# Patient Record
Sex: MALE | Race: WHITE | NOT HISPANIC OR LATINO | Employment: PART TIME | URBAN - METROPOLITAN AREA
[De-identification: names, ages, dates, MRNs, and addresses within clinical notes are randomized per-mention and may not be internally consistent; named-entity substitution may affect disease eponyms.]

---

## 2017-10-31 ENCOUNTER — TELEPHONE (OUTPATIENT)
Dept: FAMILY MEDICINE | Facility: CLINIC | Age: 27
End: 2017-10-31

## 2017-10-31 NOTE — TELEPHONE ENCOUNTER
----- Message from Janna Lindsay sent at 10/31/2017  4:05 PM CDT -----  Contact: self   Patient want to speak with a nurse regarding scheduling an appointment to establish care and medication refill. Please call back at [phone]

## 2017-11-02 ENCOUNTER — TELEPHONE (OUTPATIENT)
Dept: FAMILY MEDICINE | Facility: CLINIC | Age: 27
End: 2017-11-02

## 2017-11-02 ENCOUNTER — OFFICE VISIT (OUTPATIENT)
Dept: FAMILY MEDICINE | Facility: CLINIC | Age: 27
End: 2017-11-02
Payer: MEDICAID

## 2017-11-02 ENCOUNTER — LAB VISIT (OUTPATIENT)
Dept: LAB | Facility: HOSPITAL | Age: 27
End: 2017-11-02
Attending: INTERNAL MEDICINE
Payer: MEDICAID

## 2017-11-02 VITALS
DIASTOLIC BLOOD PRESSURE: 84 MMHG | WEIGHT: 159.63 LBS | BODY MASS INDEX: 22.85 KG/M2 | SYSTOLIC BLOOD PRESSURE: 112 MMHG | HEART RATE: 72 BPM | TEMPERATURE: 98 F | HEIGHT: 70 IN

## 2017-11-02 DIAGNOSIS — Z79.899 HIGH RISK MEDICATION USE: ICD-10-CM

## 2017-11-02 DIAGNOSIS — G40.909 NONINTRACTABLE EPILEPSY WITHOUT STATUS EPILEPTICUS, UNSPECIFIED EPILEPSY TYPE: ICD-10-CM

## 2017-11-02 DIAGNOSIS — G40.109 PARTIAL SYMPTOMATIC EPILEPSY WITH SIMPLE PARTIAL SEIZURES, NOT INTRACTABLE, WITHOUT STATUS EPILEPTICUS: ICD-10-CM

## 2017-11-02 DIAGNOSIS — G40.109 PARTIAL SYMPTOMATIC EPILEPSY WITH SIMPLE PARTIAL SEIZURES, NOT INTRACTABLE, WITHOUT STATUS EPILEPTICUS: Primary | ICD-10-CM

## 2017-11-02 DIAGNOSIS — G40.109 TEMPORAL LOBE EPILEPSY: ICD-10-CM

## 2017-11-02 LAB
ALBUMIN SERPL BCP-MCNC: 4.3 G/DL
ALP SERPL-CCNC: 109 U/L
ALT SERPL W/O P-5'-P-CCNC: 23 U/L
ANION GAP SERPL CALC-SCNC: 7 MMOL/L
AST SERPL-CCNC: 22 U/L
BASOPHILS # BLD AUTO: 0.03 K/UL
BASOPHILS NFR BLD: 0.5 %
BILIRUB SERPL-MCNC: 0.5 MG/DL
BUN SERPL-MCNC: 17 MG/DL
CALCIUM SERPL-MCNC: 9.6 MG/DL
CHLORIDE SERPL-SCNC: 102 MMOL/L
CO2 SERPL-SCNC: 27 MMOL/L
CREAT SERPL-MCNC: 1 MG/DL
DIFFERENTIAL METHOD: ABNORMAL
EOSINOPHIL # BLD AUTO: 0.2 K/UL
EOSINOPHIL NFR BLD: 2.4 %
ERYTHROCYTE [DISTWIDTH] IN BLOOD BY AUTOMATED COUNT: 11.4 %
EST. GFR  (AFRICAN AMERICAN): >60 ML/MIN/1.73 M^2
EST. GFR  (NON AFRICAN AMERICAN): >60 ML/MIN/1.73 M^2
GLUCOSE SERPL-MCNC: 88 MG/DL
HCT VFR BLD AUTO: 43.6 %
HGB BLD-MCNC: 14.9 G/DL
IMM GRANULOCYTES # BLD AUTO: 0.03 K/UL
IMM GRANULOCYTES NFR BLD AUTO: 0.5 %
LYMPHOCYTES # BLD AUTO: 1.9 K/UL
LYMPHOCYTES NFR BLD: 29 %
MCH RBC QN AUTO: 30.7 PG
MCHC RBC AUTO-ENTMCNC: 34.2 G/DL
MCV RBC AUTO: 90 FL
MONOCYTES # BLD AUTO: 0.6 K/UL
MONOCYTES NFR BLD: 8.8 %
NEUTROPHILS # BLD AUTO: 3.9 K/UL
NEUTROPHILS NFR BLD: 58.8 %
NRBC BLD-RTO: 0 /100 WBC
PLATELET # BLD AUTO: 168 K/UL
PMV BLD AUTO: 11.9 FL
POTASSIUM SERPL-SCNC: 4.3 MMOL/L
PROT SERPL-MCNC: 8.4 G/DL
RBC # BLD AUTO: 4.85 M/UL
SODIUM SERPL-SCNC: 136 MMOL/L
WBC # BLD AUTO: 6.56 K/UL

## 2017-11-02 PROCEDURE — 85025 COMPLETE CBC W/AUTO DIFF WBC: CPT

## 2017-11-02 PROCEDURE — 99213 OFFICE O/P EST LOW 20 MIN: CPT | Mod: S$PBB,,, | Performed by: INTERNAL MEDICINE

## 2017-11-02 PROCEDURE — 99999 PR PBB SHADOW E&M-EST. PATIENT-LVL III: CPT | Mod: PBBFAC,,, | Performed by: INTERNAL MEDICINE

## 2017-11-02 PROCEDURE — 80053 COMPREHEN METABOLIC PANEL: CPT

## 2017-11-02 PROCEDURE — 80183 DRUG SCRN QUANT OXCARBAZEPIN: CPT

## 2017-11-02 PROCEDURE — 99213 OFFICE O/P EST LOW 20 MIN: CPT | Mod: PBBFAC,PN | Performed by: INTERNAL MEDICINE

## 2017-11-02 RX ORDER — LEVETIRACETAM 750 MG/1
1500 TABLET ORAL 2 TIMES DAILY
Qty: 120 TABLET | Refills: 0 | Status: SHIPPED | OUTPATIENT
Start: 2017-11-02 | End: 2017-12-07 | Stop reason: SDUPTHER

## 2017-11-02 RX ORDER — OXCARBAZEPINE 300 MG/1
TABLET, FILM COATED ORAL
Qty: 150 TABLET | Refills: 0 | Status: SHIPPED | OUTPATIENT
Start: 2017-11-02 | End: 2017-12-07 | Stop reason: SDUPTHER

## 2017-11-02 RX ORDER — CLONAZEPAM 1 MG/1
1 TABLET ORAL 2 TIMES DAILY
Qty: 60 TABLET | Refills: 0 | Status: SHIPPED | OUTPATIENT
Start: 2017-11-02 | End: 2017-11-02 | Stop reason: SDUPTHER

## 2017-11-02 RX ORDER — CLONAZEPAM 1 MG/1
1 TABLET ORAL 2 TIMES DAILY
Qty: 60 TABLET | Refills: 0 | Status: SHIPPED | OUTPATIENT
Start: 2017-11-02 | End: 2017-11-30 | Stop reason: SDUPTHER

## 2017-11-02 NOTE — TELEPHONE ENCOUNTER
Spoke with pharmacy, states has not received Rx. Printed confirmation receipt and faxed to pharmacy.

## 2017-11-02 NOTE — TELEPHONE ENCOUNTER
----- Message from Eliza Cardenas sent at 11/2/2017 12:36 PM CDT -----  Contact: Elias  Pharmacy advises Elias they have no prescriptions for him. Please call him to advise when resent.  [phone] (home)   Thanks!

## 2017-11-02 NOTE — PROGRESS NOTES
Assessment and Plan:    1. Partial symptomatic epilepsy with simple partial seizures, not intractable, without status epilepticus  Patient presented to establish care, but had no complaints or concerns other than needing anti-epileptic medications refilled. He had called prior to making this apt, and was clearly told that I would at most prescribe a short duration of these medications until he could get in to see his neurologist and would not be prescribing these medications on an ongoing basis. Unfortunately, he had not mentioned prior to arriving today that since his insurance changed to Medicaid, he was told he would not be able to continue seeing his previous Neurologist. Checked today and there are no available appointments for any [facility] for Medicaid patients at this time. He was given the escalation number and told that he needs to establish care with neurology as this is not something that I will be prescribing or monitoring on an ongoing basis.    - Comprehensive metabolic panel; Future  - CBC auto differential; Future  - OXCARBAZEPINE METABOLITE ([facility]); Future    2. High risk medication use  - Comprehensive metabolic panel; Future  - CBC auto differential; Future  - OXCARBAZEPINE METABOLITE ([facility]); Future    3. Nonintractable epilepsy without status epilepticus, unspecified epilepsy type  4. Temporal lobe epilepsy  - levETIRAcetam (KEPPRA) 750 MG Tab; Take 2 tablets (1,500 mg total) by mouth 2 (two) times daily.  Dispense: 120 tablet; Refill: 0  - OXcarbazepine (TRILEPTAL) 300 MG Tab; 2-1/2 tabs PO BID  Dispense: 150 tablet; Refill: 0  - clonazePAM (KLONOPIN) 1 MG tablet; Take 1 tablet (1 mg total) by mouth 2 (two) times daily.  Dispense: 60 tablet; Refill: 0        ______________________________________________________________________  Subjective:    Chief Complaint:  Establish Care    HPI:  Elias is a 27 y.o. year old man, new to me but previously seen by primary care at [facility] (seen by NP in the last 3 years but no visit with patient's previous PCP in the last 3 years), here to establish care.      He has a history of medically intractable epilepsy and underwent temporal lobectomy in childhood. He was last seen by [facility] in March 2015 prior to moving out of [facility].     He presents today to establish care after running out of his anti-epileptic medications. He notes that he has been on stable doses of these medications since around 2009, and has not had a seizure since being on this combination of medications. He denies having any side effects on these medications.     Past Medical History:  Past Medical History:   Diagnosis Date    Allergy     Epilepsy     Restless leg syndrome     Seizures         Past Surgical History:  Past Surgical History:   Procedure Laterality Date    LOBECTOMY FOR SEIZURE FOCUS      temoral         Family History:  Family History   Problem Relation Age of Onset    Seizures Paternal Grandmother     Cancer Paternal Grandmother     Emphysema Maternal Grandmother     Diabetes Maternal Grandmother     Tuberculosis Maternal Grandmother     Diabetes Maternal Grandfather     Arthritis Paternal Grandfather        Social History:  Social History     Social History    Marital status: Single     Spouse name: N/A    Number of children: N/A    Years of education: N/A     Social History Main Topics    Smoking status: Never Smoker    Smokeless tobacco: Never Used    Alcohol use No    Drug use: No    Sexual activity: Not Currently     Other Topics Concern    None     Social History Narrative    Currently not working, looking for a job since moving back to [place].        Medications:      Current Outpatient Prescriptions:     clonazePAM (KLONOPIN) 1 MG tablet, Take 1 tablet (1 mg total) by mouth 2 (two) times daily., Disp: 60 tablet, Rfl: 0    fluticasone (FLONASE) 50 mcg/actuation nasal spray, 1 spray by Each Nare route once daily.,  "Disp: 48 g, Rfl: 0    levETIRAcetam (KEPPRA) 750 MG Tab, Take 2 tablets (1,500 mg total) by mouth 2 (two) times daily., Disp: 120 tablet, Rfl: 0    OXcarbazepine (TRILEPTAL) 300 MG Tab, 2-1/2 tabs PO BID, Disp: 150 tablet, Rfl: 0      Allergies:  Phenobarbital    Immunizations:  Immunization History   Administered Date(s) Administered    Influenza A (H1N1) 2009 Monovalent - IM - PF 12/16/2009    Tdap 09/05/2012       Review of Systems:  Review of Systems   Constitutional: Negative for chills and fever.   Respiratory: Negative for cough and shortness of breath.    Cardiovascular: Negative for chest pain and leg swelling.   Gastrointestinal: Negative for constipation, diarrhea, nausea and vomiting.   Neurological: Negative for tingling, tremors, sensory change, speech change, focal weakness, seizures and loss of consciousness.       Objective:    Vitals:  Vitals:    11/02/17 0952   BP: 112/84   Pulse: 72   Temp: 98.2 °F (36.8 °C)   Weight: 72.4 kg (159 lb 9.8 oz)   Height: 5' 10" (1.778 m)   PainSc: 0-No pain       Physical Exam   Constitutional: He is oriented to person, place, and time. He appears well-developed and well-nourished. No distress.   HENT:   Mouth/Throat: Oropharynx is clear and moist.   Eyes: No scleral icterus.   Cardiovascular: Normal rate and regular rhythm.    No murmur heard.  Pulmonary/Chest: Effort normal and breath sounds normal. No respiratory distress. He has no wheezes.   Musculoskeletal: He exhibits no edema.   Neurological: He is alert and oriented to person, place, and time.   choreiform movements in left arm (stable per patient and discussed in previous neurology notes)   Skin: Skin is warm and dry.   Psychiatric: He has a normal mood and affect. His behavior is normal.   Vitals reviewed.      Data:  Previous neurology notes reviewed and pertinent for history as described in HPI.        Cristina Huang MD  Internal Medicine    "

## 2017-11-02 NOTE — LETTER
November 2, 2017      No Recipients           [facility]  [address]  [place] [address]  Phone: [phone]  Fax: [phone]          Patient: Elias Lopez   MR Number: [id number]   YOB: 1990   Date of Visit: 11/2/2017       Dear :    Thank you for referring Elias Lopez to me for evaluation. Attached you will find relevant portions of my assessment and plan of care.    If you have questions, please do not hesitate to call me. I look forward to following Elias Lopez along with you.    Sincerely,    Cristina Huang MD    Enclosure  CC:  No Recipients    If you would like to receive this communication electronically, please contact [email or web address] or [phone] to request more information on [employer] access.    For providers and/or their staff who would like to refer a patient to [facility], please contact us through our one-stop-shop provider referral line, Samira Basurto, at [phone].    If you feel you have received this communication in error or would no longer like to receive these types of communications, please e-mail [email or web address]

## 2017-11-06 ENCOUNTER — TELEPHONE (OUTPATIENT)
Dept: NEUROLOGY | Facility: CLINIC | Age: 27
End: 2017-11-06

## 2017-11-06 LAB — OXCARBAZEPINE METABOLITE: 23 MCG/ML

## 2017-11-06 NOTE — TELEPHONE ENCOUNTER
----- Message from Eliza Cardenas sent at 11/3/2017 10:03 AM CDT -----  Contact: Elias Griffin say Dr. Borja 2 years ago. Dr. Huang told him yesterday that he needs to come back to clinic to get seizure medication. He was in [place] previously and came to [place]Dr. Huang gave enough medication until he ca be seen (60 Tablets). Please call him [phone] (home)   Thanks!

## 2017-11-20 ENCOUNTER — HOSPITAL ENCOUNTER (EMERGENCY)
Facility: HOSPITAL | Age: 27
Discharge: HOME | End: 2017-11-21
Attending: EMERGENCY MEDICINE
Payer: MEDICAID

## 2017-11-20 DIAGNOSIS — N20.0 KIDNEY STONE ON RIGHT SIDE: Primary | ICD-10-CM

## 2017-11-20 DIAGNOSIS — R10.9 RIGHT FLANK PAIN: ICD-10-CM

## 2017-11-20 PROCEDURE — 99284 EMERGENCY DEPT VISIT MOD MDM: CPT | Mod: 25

## 2017-11-20 PROCEDURE — 96361 HYDRATE IV INFUSION ADD-ON: CPT

## 2017-11-20 PROCEDURE — 96374 THER/PROPH/DIAG INJ IV PUSH: CPT

## 2017-11-21 ENCOUNTER — TELEPHONE (OUTPATIENT)
Dept: UROLOGY | Facility: CLINIC | Age: 27
End: 2017-11-21

## 2017-11-21 VITALS
HEART RATE: 66 BPM | HEIGHT: 70 IN | RESPIRATION RATE: 16 BRPM | OXYGEN SATURATION: 98 % | TEMPERATURE: 99 F | DIASTOLIC BLOOD PRESSURE: 58 MMHG | WEIGHT: 157.63 LBS | SYSTOLIC BLOOD PRESSURE: 100 MMHG | BODY MASS INDEX: 22.57 KG/M2

## 2017-11-21 LAB
ALBUMIN SERPL BCP-MCNC: 4.1 G/DL
ALP SERPL-CCNC: 103 U/L
ALT SERPL W/O P-5'-P-CCNC: 16 U/L
ANION GAP SERPL CALC-SCNC: 10 MMOL/L
AST SERPL-CCNC: 19 U/L
BACTERIA #/AREA URNS HPF: ABNORMAL /HPF
BASOPHILS # BLD AUTO: 0 K/UL
BASOPHILS NFR BLD: 0.2 %
BILIRUB SERPL-MCNC: 0.3 MG/DL
BILIRUB UR QL STRIP: NEGATIVE
BUN SERPL-MCNC: 13 MG/DL
CALCIUM SERPL-MCNC: 9.3 MG/DL
CAOX CRY URNS QL MICRO: ABNORMAL
CHLORIDE SERPL-SCNC: 105 MMOL/L
CLARITY UR: ABNORMAL
CO2 SERPL-SCNC: 26 MMOL/L
COLOR UR: YELLOW
CREAT SERPL-MCNC: 1 MG/DL
DIFFERENTIAL METHOD: ABNORMAL
EOSINOPHIL # BLD AUTO: 0.1 K/UL
EOSINOPHIL NFR BLD: 0.7 %
ERYTHROCYTE [DISTWIDTH] IN BLOOD BY AUTOMATED COUNT: 12.9 %
EST. GFR  (AFRICAN AMERICAN): >60 ML/MIN/1.73 M^2
EST. GFR  (NON AFRICAN AMERICAN): >60 ML/MIN/1.73 M^2
GLUCOSE SERPL-MCNC: 126 MG/DL
GLUCOSE UR QL STRIP: NEGATIVE
HCT VFR BLD AUTO: 41.7 %
HGB BLD-MCNC: 14.4 G/DL
HGB UR QL STRIP: ABNORMAL
HYALINE CASTS #/AREA URNS LPF: 0 /LPF
KETONES UR QL STRIP: NEGATIVE
LEUKOCYTE ESTERASE UR QL STRIP: NEGATIVE
LIPASE SERPL-CCNC: 22 U/L
LYMPHOCYTES # BLD AUTO: 1.2 K/UL
LYMPHOCYTES NFR BLD: 11.1 %
MCH RBC QN AUTO: 31.7 PG
MCHC RBC AUTO-ENTMCNC: 34.5 G/DL
MCV RBC AUTO: 92 FL
MICROSCOPIC COMMENT: ABNORMAL
MONOCYTES # BLD AUTO: 0.7 K/UL
MONOCYTES NFR BLD: 7.1 %
NEUTROPHILS # BLD AUTO: 8.6 K/UL
NEUTROPHILS NFR BLD: 80.9 %
NITRITE UR QL STRIP: NEGATIVE
PH UR STRIP: 7 [PH] (ref 5–8)
PLATELET # BLD AUTO: 155 K/UL
PMV BLD AUTO: 9.7 FL
POTASSIUM SERPL-SCNC: 3.5 MMOL/L
PROT SERPL-MCNC: 7.4 G/DL
PROT UR QL STRIP: ABNORMAL
RBC # BLD AUTO: 4.53 M/UL
RBC #/AREA URNS HPF: 80 /HPF (ref 0–4)
SODIUM SERPL-SCNC: 141 MMOL/L
SP GR UR STRIP: 1.02 (ref 1–1.03)
URN SPEC COLLECT METH UR: ABNORMAL
UROBILINOGEN UR STRIP-ACNC: NEGATIVE EU/DL
WBC # BLD AUTO: 10.6 K/UL
WBC #/AREA URNS HPF: 1 /HPF (ref 0–5)

## 2017-11-21 PROCEDURE — 83690 ASSAY OF LIPASE: CPT

## 2017-11-21 PROCEDURE — 85025 COMPLETE CBC W/AUTO DIFF WBC: CPT

## 2017-11-21 PROCEDURE — 81000 URINALYSIS NONAUTO W/SCOPE: CPT

## 2017-11-21 PROCEDURE — 80053 COMPREHEN METABOLIC PANEL: CPT

## 2017-11-21 PROCEDURE — 36415 COLL VENOUS BLD VENIPUNCTURE: CPT

## 2017-11-21 PROCEDURE — 63600175 PHARM REV CODE 636 W HCPCS: Performed by: EMERGENCY MEDICINE

## 2017-11-21 PROCEDURE — 25000003 PHARM REV CODE 250: Performed by: EMERGENCY MEDICINE

## 2017-11-21 RX ORDER — KETOROLAC TROMETHAMINE 30 MG/ML
10 INJECTION, SOLUTION INTRAMUSCULAR; INTRAVENOUS
Status: COMPLETED | OUTPATIENT
Start: 2017-11-21 | End: 2017-11-21

## 2017-11-21 RX ORDER — TAMSULOSIN HYDROCHLORIDE 0.4 MG/1
0.4 CAPSULE ORAL DAILY
Qty: 30 CAPSULE | Refills: 3 | Status: SHIPPED | OUTPATIENT
Start: 2017-11-21 | End: 2017-12-07

## 2017-11-21 RX ORDER — ONDANSETRON 4 MG/1
4 TABLET, ORALLY DISINTEGRATING ORAL EVERY 8 HOURS PRN
Qty: 12 TABLET | Refills: 0 | Status: SHIPPED | OUTPATIENT
Start: 2017-11-21 | End: 2017-12-07

## 2017-11-21 RX ORDER — KETOROLAC TROMETHAMINE 30 MG/ML
INJECTION, SOLUTION INTRAMUSCULAR; INTRAVENOUS
Status: COMPLETED
Start: 2017-11-21 | End: 2017-11-21

## 2017-11-21 RX ORDER — HYDROCODONE BITARTRATE AND ACETAMINOPHEN 5; 325 MG/1; MG/1
1 TABLET ORAL EVERY 6 HOURS PRN
Qty: 16 TABLET | Refills: 0 | Status: SHIPPED | OUTPATIENT
Start: 2017-11-21 | End: 2017-12-07

## 2017-11-21 RX ADMIN — SODIUM CHLORIDE 1000 ML: 0.9 INJECTION, SOLUTION INTRAVENOUS at 12:11

## 2017-11-21 RX ADMIN — KETOROLAC TROMETHAMINE 10 MG: 30 INJECTION, SOLUTION INTRAMUSCULAR at 12:11

## 2017-11-21 NOTE — TELEPHONE ENCOUNTER
Returned call and spoke with patient, informed that the NP looked over his chart, he does not need a appt this week, she will send in some flomax for him to start taking because he should be able to pass the stone and give us a call next week if he does not, patient verbally understood.

## 2017-11-21 NOTE — ED NOTES
"Patient identifiers for Elias Drewwer checked and correct.  LOC:  Patient is awake, alert, and aware of environment with an appropriate affect. Patient is oriented x 3 and speaking appropriately.  APPEARANCE:  Patient resting comfortably and in no acute distress. Patient is clean and well groomed, patient's clothing is properly fastened.  SKIN:  The skin is warm and dry. Patient has normal skin turgor and moist mucus membranes. Skin is intact; no bruising or breakdown noted.  MUSCULOSKELETAL:  Pt has left sided tremors, baseline. Pt has unsteady gait due to pain being so bad, walks hunched over. Pulses intact.   RESPIRATORY:  Airway is open and patent. Respirations are spontaneous and non-labored with normal effort and rate.  CARDIAC:  Patient has a normal rate and rhythm. No peripheral edema noted. Capillary refill < 3 seconds.  ABDOMEN:  No distention noted.  Tenderness to RLQ radiating across abdomen. C/O "pin needles" when urinating.   NEUROLOGICAL:  PERRL. Facial expression is symmetrical. Hand grasps are equal bilaterally. Normal sensation in all extremities when touched with finger.  Allergies reported:    Review of patient's allergies indicates:   Allergen Reactions    Phenobarbital      OTHER NOTES: Pt in bed, locked, lowest position, side rails up, Will continue to monitor.      "

## 2017-11-21 NOTE — ED PROVIDER NOTES
Encounter Date: 11/20/2017    SCRIBE #1 NOTE: I, Afia Cintron , am scribing for, and in the presence of,  Dr. Main . I have scribed the entire note.       History     Chief Complaint   Patient presents with    Abdominal Pain     right side that started around 7 pm    Vomiting     Times 2 in the last hour with blood noted in it     11/21/2017  12:10 AM     Chief Complaint: Abdominal pain       The patient is a 27 y.o. male who is presenting with the acute onset of RLQ abdominal pain that began x 2 hours ago. The pt reports that the pain is constant, moderate, and does not radiate. He denies any exacerbating or mitigating factors. Associated sx includes x 2 episodes of emesis with scant amount of blood present. He denies recent urinary sx, diarrhea, constipation, or hematochezia. Pertinent PMHx includes seizures. No pertinent past surgical hx.              The history is provided by the patient and medical records.     Review of patient's allergies indicates:   Allergen Reactions    Phenobarbital      Past Medical History:   Diagnosis Date    Allergy     Epilepsy     Restless leg syndrome     Seizures      Past Surgical History:   Procedure Laterality Date    LOBECTOMY FOR SEIZURE FOCUS      temoral       Family History   Problem Relation Age of Onset    Seizures Paternal Grandmother     Cancer Paternal Grandmother     Emphysema Maternal Grandmother     Diabetes Maternal Grandmother     Tuberculosis Maternal Grandmother     Diabetes Maternal Grandfather     Arthritis Paternal Grandfather      Social History   Substance Use Topics    Smoking status: Never Smoker    Smokeless tobacco: Never Used    Alcohol use No     Review of Systems   Constitutional: Negative for fever.   HENT: Negative for sore throat.    Eyes: Negative for visual disturbance.   Respiratory: Negative for shortness of breath.    Cardiovascular: Negative for chest pain.   Gastrointestinal: Positive for abdominal pain and vomiting  (with blood ). Negative for nausea.   Genitourinary: Negative for dysuria.   Musculoskeletal: Negative for back pain.   Skin: Negative for rash.   Neurological: Negative for weakness.   Hematological: Does not bruise/bleed easily.   Psychiatric/Behavioral: Negative for confusion.       Physical Exam     Initial Vitals [11/20/17 2342]   BP Pulse Resp Temp SpO2   (!) 126/92 68 16 98.9 °F (37.2 °C) 97 %      MAP       103.33         Physical Exam    Nursing note and vitals reviewed.  Constitutional: He appears well-developed and well-nourished.   HENT:   Head: Normocephalic.   Mouth/Throat: Oropharynx is clear and moist.   Well healing abrasions present across the forehead and nose    Eyes: EOM are normal. Pupils are equal, round, and reactive to light.   Neck: Normal range of motion. Neck supple.   Cardiovascular: Normal rate, regular rhythm, normal heart sounds and intact distal pulses. Exam reveals no gallop and no friction rub.    No murmur heard.  Pulmonary/Chest: Breath sounds normal. He has no wheezes. He has no rhonchi. He has no rales.   Abdominal: Soft. He exhibits no distension. There is tenderness. There is no rebound.   RLQ TTP   Musculoskeletal: Normal range of motion. He exhibits no edema or tenderness.   Lymphadenopathy:     He has no cervical adenopathy.   Neurological: He is alert and oriented to person, place, and time. He has normal strength. No sensory deficit.   Skin: Skin is warm and dry. Capillary refill takes less than 2 seconds.         ED Course   Procedures  Labs Reviewed   LIPASE   URINALYSIS   COMPREHENSIVE METABOLIC PANEL   CBC W/ AUTO DIFFERENTIAL             Medical Decision Making:   Initial Assessment:   27-year-old male presented with a chief complaint of flank pain.  Differential Diagnosis:   Ddx includes but is not limited to appendicitis, nephrolithiasis, pyelonephritis, viral illness.   ED Management:  The patient was emergently evaluated in the emergency Department, his  evaluation was significant for a young male with right flank tenderness.  The patient's labs were significant for large amount of red blood cells in his urine.  The patient's pain was treated with parenteral Toradol, with improvement   In it.  The patient's CT scan does show a 2 mm stone at the right UVJ with some mild right-sided hydronephrosis and hydroureter.  The patient's diagnosis is a right-sided kidney stone.  He is stable for discharge to home.  He will be discharged home with by mouth pain medication and ODT Zofran.  He is referred to urology for follow-up and further care.            Scribe Attestation:   Scribe #1: I performed the above scribed service and the documentation accurately describes the services I performed. I attest to the accuracy of the note.    I, Dr. Bonilla Main, personally performed the services described in this documentation. All medical record entries made by the scribe were at my direction and in my presence.  I have reviewed the chart and agree that the record reflects my personal performance and is accurate and complete. Bonilla Main MD.  12:17 AM 11/21/2017          ED Course      Clinical Impression:   The primary encounter diagnosis was Kidney stone on right side. A diagnosis of Right flank pain was also pertinent to this visit.                           Bonilla Main MD  11/21/17 0257

## 2017-11-24 ENCOUNTER — TELEPHONE (OUTPATIENT)
Dept: UROLOGY | Facility: CLINIC | Age: 27
End: 2017-11-24

## 2017-11-24 NOTE — TELEPHONE ENCOUNTER
Returned call and spoke with patient, patient states he only wants to see Dr Feng, soonest appt made, patient verbally understood.

## 2017-11-24 NOTE — TELEPHONE ENCOUNTER
----- Message from Sherine Dickerson sent at 11/24/2017 10:17 AM CST -----  Patient was seen in Emergency room at [facility] for kidney stone/was told to follow up with doctor/requesting to see doctor ONLY/no soon appointment showing available/please call patient back at [phone] to schedule or advise.

## 2017-11-30 DIAGNOSIS — G40.109 TEMPORAL LOBE EPILEPSY: ICD-10-CM

## 2017-11-30 DIAGNOSIS — G40.909 NONINTRACTABLE EPILEPSY WITHOUT STATUS EPILEPTICUS, UNSPECIFIED EPILEPSY TYPE: ICD-10-CM

## 2017-11-30 RX ORDER — CLONAZEPAM 1 MG/1
1 TABLET ORAL 2 TIMES DAILY
Qty: 14 TABLET | Refills: 0 | Status: SHIPPED | OUTPATIENT
Start: 2017-11-30 | End: 2017-12-01 | Stop reason: SDUPTHER

## 2017-11-30 NOTE — TELEPHONE ENCOUNTER
----- Message from Eliza Cardenas sent at 11/30/2017 11:52 AM CST -----  Contact: Elias  Calling for a refill on   Rx clonazePAM (KLONOPIN) 1 MG tablet    [facility] [place]  [address]  [address]  [place] [address]  Phone: [phone] Fax: [phone]

## 2017-11-30 NOTE — TELEPHONE ENCOUNTER
Spoke to patient, notes that he has an appointment with a new Neurologist on Dec 7th. Have agreed to prescribe 1 additional week of the clonazepam with no refills after this.

## 2017-12-01 DIAGNOSIS — G40.909 NONINTRACTABLE EPILEPSY WITHOUT STATUS EPILEPTICUS, UNSPECIFIED EPILEPSY TYPE: ICD-10-CM

## 2017-12-01 DIAGNOSIS — G40.109 TEMPORAL LOBE EPILEPSY: ICD-10-CM

## 2017-12-01 RX ORDER — CLONAZEPAM 1 MG/1
1 TABLET ORAL 2 TIMES DAILY
Qty: 14 TABLET | Refills: 0 | Status: SHIPPED | OUTPATIENT
Start: 2017-12-01 | End: 2017-12-07 | Stop reason: SDUPTHER

## 2017-12-01 NOTE — TELEPHONE ENCOUNTER
----- Message from Stephanie Ferrara sent at 12/1/2017  1:20 PM CST -----  Contact: self   Patient need a refill on clonazePAM please send to [facility], any questions please call back at [phone] (home)     [facility] [place] - [address]  [address]  [place] [address]  Phone: [phone] Fax: [phone]

## 2017-12-07 ENCOUNTER — TELEPHONE (OUTPATIENT)
Dept: NEUROLOGY | Facility: CLINIC | Age: 27
End: 2017-12-07

## 2017-12-07 ENCOUNTER — HOSPITAL ENCOUNTER (OUTPATIENT)
Dept: RADIOLOGY | Facility: HOSPITAL | Age: 27
Discharge: HOME | End: 2017-12-07
Attending: PSYCHIATRY & NEUROLOGY
Payer: MEDICAID

## 2017-12-07 ENCOUNTER — OFFICE VISIT (OUTPATIENT)
Dept: NEUROLOGY | Facility: CLINIC | Age: 27
End: 2017-12-07
Payer: MEDICAID

## 2017-12-07 VITALS
HEART RATE: 97 BPM | HEIGHT: 70 IN | DIASTOLIC BLOOD PRESSURE: 72 MMHG | BODY MASS INDEX: 22.65 KG/M2 | SYSTOLIC BLOOD PRESSURE: 121 MMHG | WEIGHT: 158.19 LBS

## 2017-12-07 DIAGNOSIS — G40.109 TEMPORAL LOBE EPILEPSY: Primary | ICD-10-CM

## 2017-12-07 DIAGNOSIS — G40.909 NONINTRACTABLE EPILEPSY WITHOUT STATUS EPILEPTICUS, UNSPECIFIED EPILEPSY TYPE: ICD-10-CM

## 2017-12-07 DIAGNOSIS — Z79.899 HIGH RISK MEDICATION USE: ICD-10-CM

## 2017-12-07 DIAGNOSIS — G40.109 TEMPORAL LOBE EPILEPSY: ICD-10-CM

## 2017-12-07 DIAGNOSIS — G25.71 AKATHISIA: ICD-10-CM

## 2017-12-07 PROCEDURE — 99999 PR PBB SHADOW E&M-EST. PATIENT-LVL III: CPT | Mod: PBBFAC,,, | Performed by: PSYCHIATRY & NEUROLOGY

## 2017-12-07 PROCEDURE — 99214 OFFICE O/P EST MOD 30 MIN: CPT | Mod: S$PBB,,, | Performed by: PSYCHIATRY & NEUROLOGY

## 2017-12-07 PROCEDURE — 77080 DXA BONE DENSITY AXIAL: CPT | Mod: TC,PO

## 2017-12-07 PROCEDURE — 77080 DXA BONE DENSITY AXIAL: CPT | Mod: 26,,, | Performed by: RADIOLOGY

## 2017-12-07 PROCEDURE — 99213 OFFICE O/P EST LOW 20 MIN: CPT | Mod: PBBFAC,25,PN | Performed by: PSYCHIATRY & NEUROLOGY

## 2017-12-07 RX ORDER — OXCARBAZEPINE 300 MG/1
TABLET, FILM COATED ORAL
Qty: 450 TABLET | Refills: 3 | Status: SHIPPED | OUTPATIENT
Start: 2017-12-07 | End: 2018-09-06 | Stop reason: SDUPTHER

## 2017-12-07 RX ORDER — LEVETIRACETAM 750 MG/1
1500 TABLET ORAL 2 TIMES DAILY
Qty: 360 TABLET | Refills: 3 | Status: SHIPPED | OUTPATIENT
Start: 2017-12-07 | End: 2019-01-18 | Stop reason: SDUPTHER

## 2017-12-07 RX ORDER — CLONAZEPAM 1 MG/1
1 TABLET ORAL 2 TIMES DAILY
Qty: 60 TABLET | Refills: 1 | Status: SHIPPED | OUTPATIENT
Start: 2017-12-07 | End: 2018-03-15

## 2017-12-07 NOTE — PROGRESS NOTES
Date of service:  12/7/2017    Chief complaint:  Seizures, questions about DBS    Interval history:  The patient is a 27 y.o. male seen previously for partial onset seizures.  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  I last saw him nearly 3 years ago.  Since the last time he was here, he been taking Keppra 1500 mg BID to his Trileptal 750 mg BID.  He notes no side effects.  He reports no further seizures.  Also of note, he has been told that his visual issues related to his surgery will prevent him from ever being able to drive.  Regarding his movement disorder, the patient has also not been following with Dr. Echevarria.  It appears that it has also been about 3 years since he was seen in movement disorder clinic.  Otherwise the patient has no new complaints.      Past Medical History:   Diagnosis Date    Allergy     Epilepsy     Restless leg syndrome     Seizures        Past Surgical History:   Procedure Laterality Date    LOBECTOMY FOR SEIZURE FOCUS      temoral         Family History   Problem Relation Age of Onset    Seizures Paternal Grandmother     Cancer Paternal Grandmother     Emphysema Maternal Grandmother     Diabetes Maternal Grandmother     Tuberculosis Maternal Grandmother     Diabetes Maternal Grandfather     Arthritis Paternal Grandfather        Social History     Social History    Marital status: Single     Spouse name: N/A    Number of children: N/A    Years of education: N/A     Social History Main Topics    Smoking status: Never Smoker    Smokeless tobacco: Never Used    Alcohol use No    Drug use: No    Sexual activity: Not Currently     Other Topics Concern    None     Social History Narrative    Currently not working, looking for a job since moving back to [place].        Review of systems not significantly changed from previous visit except as noted above.    /72 (BP Location: Right arm, Patient Position:  "Sitting, BP Method: Medium (Automatic))   Pulse 97   Ht 5' 10" (1.778 m)   Wt 71.7 kg (158 lb 2.9 oz)   BMI 22.70 kg/m²   Physical exam not significantly changed from previous visit aside from notable improvement in his left arm movements.    Data base:  Chart reviewed.      Labs (11/17):  CMP: notable for normal sodium and LFTs  CBC: unremarkable  Trileptal: 23    MRI of the brain (1/15):   "1. Postoperative right temporal encephalomalacia.  2. Old right periventricular lacunar infarct."   I independently visualized and interpreted this study.     Assessment and plan:  The patient is a 27 y.o. male who returns for follow up on partial onset seizures, improved after temporal lobectomy.  We will continue his Trileptal at 750mg BID and Keppra 1500 mg BID.  We will check a DEXA, as Trileptal may reduce bone density.  We will check labs for medication monitoring purposes.  Medication side effects, seizure safety, and driving laws were reviewed.      Regarding his left arm movements, these were better today than I recall them being at our last visit.  He was following with Dr. Echevarria for this issue but has not seen him recently.  He did not pursue DBS surgery.  I am a bit confused by his medication regimen for this.  In looking at Epic, it appears that Dr. Echevarria was trying Onfi for him; however, he presents today with Klonopin written by his PCP instead.  I have instructed the patient to follow up in movement disorder clinic to clarify what the appropriate regimen for him should be.  I have given him a 2 month supply of Klonopin so that he will not run out prior to this visit.  I made clear to the patient, though, that this would not be an issue I would be managing for him in the long run.    "

## 2017-12-12 ENCOUNTER — OFFICE VISIT (OUTPATIENT)
Dept: UROLOGY | Facility: CLINIC | Age: 27
End: 2017-12-12
Payer: MEDICAID

## 2017-12-12 ENCOUNTER — HOSPITAL ENCOUNTER (OUTPATIENT)
Dept: RADIOLOGY | Facility: HOSPITAL | Age: 27
Discharge: HOME | End: 2017-12-12
Attending: UROLOGY
Payer: MEDICAID

## 2017-12-12 VITALS
RESPIRATION RATE: 18 BRPM | WEIGHT: 150 LBS | HEART RATE: 81 BPM | TEMPERATURE: 98 F | DIASTOLIC BLOOD PRESSURE: 71 MMHG | HEIGHT: 70 IN | SYSTOLIC BLOOD PRESSURE: 116 MMHG | BODY MASS INDEX: 21.47 KG/M2

## 2017-12-12 DIAGNOSIS — G40.109 PARTIAL SYMPTOMATIC EPILEPSY WITH SIMPLE PARTIAL SEIZURES, NOT INTRACTABLE, WITHOUT STATUS EPILEPTICUS: ICD-10-CM

## 2017-12-12 DIAGNOSIS — N20.0 CALCULUS OF KIDNEY: ICD-10-CM

## 2017-12-12 DIAGNOSIS — Z90.89 S/P LOBECTOMY OF BRAIN: ICD-10-CM

## 2017-12-12 DIAGNOSIS — N20.1 URETERAL CALCULUS, RIGHT: Primary | ICD-10-CM

## 2017-12-12 PROCEDURE — 99213 OFFICE O/P EST LOW 20 MIN: CPT | Mod: PBBFAC,25,PN | Performed by: UROLOGY

## 2017-12-12 PROCEDURE — 74000 XR ABDOMEN AP 1 VIEW: CPT | Mod: TC

## 2017-12-12 PROCEDURE — 99204 OFFICE O/P NEW MOD 45 MIN: CPT | Mod: S$PBB,,, | Performed by: UROLOGY

## 2017-12-12 PROCEDURE — 74000 XR ABDOMEN AP 1 VIEW: CPT | Mod: 26,,, | Performed by: RADIOLOGY

## 2017-12-12 PROCEDURE — 99999 PR PBB SHADOW E&M-EST. PATIENT-LVL III: CPT | Mod: PBBFAC,,, | Performed by: UROLOGY

## 2017-12-12 NOTE — PROGRESS NOTES
HISTORY AND PHYSICAL NEW PATIENT    AGE:  27.    DRUG ALLERGIES:  Phenobarbital.    CHIEF COMPLAINT:  Right ureteral calculus with a history of right flank pain.    HISTORY OF PRESENT ILLNESS:  This 27-year-old male was seen in the Emergency   Room on 11/21/2017 with acute colicky right flank pain radiating to the right   lower quadrant.  The CT scan using kidney stone protocol revealed a 2 mm distal   right ureteral calculus.  Since he was seen in the Emergency Room, he has not   experienced any further pain.  He has been straining the urine and he is not   sure whether he may have passed a stone.  He has no prior history of urinary   calculi and otherwise has a negative  history.    PAST MEDICAL HISTORY:  Epilepsy, although he has not experienced any seizure   since 2010, restless legs syndrome, allergy, he suffered a right cerebrovascular   accident at 9-10 years of age after he underwent a right temporal lobe   lobectomy that was performed to help treat his epilepsy.  This has resulted in   moderate left hemiplegia.    PAST SURGICAL HISTORY:  Includes a right temporal lobectomy as described above   at 10 to 11 years of age.    PRESENT MEDICATIONS:  Include Trileptal, Klonopin, Flonase, and Keppra.    FAMILY HISTORY:  Both parents have a history of urinary calculi.  The patient   has three sisters and one brother, otherwise in good health and none of them   have any history of stones.    SOCIAL HISTORY:  Works at [employer].  Single, no children.  Tobacco, none.    Alcohol, none.    REVIEW OF SYMPTOMS:  GENERAL:  No weight change.  Good appetite.  HEAD AND NECK:  No headaches.  Wears prescription glasses and does experience   decrease in vision in his left eye as a result of his cerebrovascular accident.  CARDIORESPIRATORY:  No chest pain or shortness of breath.  No cough.  GASTROINTESTINAL:  No trouble swallowing.  No constipation or diarrhea.  MUSCULOSKELETAL:  No back or joint problems.  HEMATOLOGIC:  No  anemia.  GENITOURINARY:  As described above.  NEUROLOGIC:  As described above.    PHYSICAL EXAMINATION:  VITAL SIGNS:  /71, pulse 81, temperature 98.1, respirations 18, weight 150   pounds, and height 5 feet 10 inches.  GENERAL:  Reveals a somewhat thin, but otherwise well-developed, well-nourished   27-year-old showing evidence of some weakness on his left side, especially in   the left upper extremity with some moderate left hemiplegia, but he was able to   ambulate and position himself on the table for examination.  HEAD AND NECK:  Throat clear.  No adenopathy.  CHEST:  Clear.  HEART:  Regular.  No murmur.  ABDOMEN:  Soft, benign and nontender.  No masses.  No hernias or organomegaly.    Most specifically, no evidence of any tenderness in either side of the abdomen.  EXTERNAL GENITALIA:  Normal phallus with adequate meatus.  Testes descended and   feel normal.  No scrotal masses.    UA, none given.    FINAL IMPRESSION:  Distal right ureteral calculus that may have passed.  It was   mentioned to the patient that in view of the small size of the stone of only 2   mm, it does have a high probability of being able to pass.    RECOMMENDATIONS:  Force p.o. fluids, strain urine, and we will obtain a KUB and   contact the patient with the KUB findings.      DAVID  dd: 12/12/2017 17:57:13 (CST)  td: 12/13/2017 08:08:15 (CST)  Doc ID   #[id number]  Job ID #[id number]    CC:

## 2018-03-13 ENCOUNTER — TELEPHONE (OUTPATIENT)
Dept: FAMILY MEDICINE | Facility: CLINIC | Age: 28
End: 2018-03-13

## 2018-03-13 NOTE — TELEPHONE ENCOUNTER
----- Message from Stephanie Ferrara sent at 3/13/2018  2:34 PM CDT -----  Contact: self   Patient want to speak with a nurse regarding scheduling appointment today returning to work on March 15th but needs to see the doctor before he can return to work please call back at [phone]

## 2018-03-15 ENCOUNTER — OFFICE VISIT (OUTPATIENT)
Dept: FAMILY MEDICINE | Facility: CLINIC | Age: 28
End: 2018-03-15
Payer: MEDICAID

## 2018-03-15 VITALS
DIASTOLIC BLOOD PRESSURE: 70 MMHG | TEMPERATURE: 98 F | HEART RATE: 104 BPM | SYSTOLIC BLOOD PRESSURE: 130 MMHG | WEIGHT: 147.06 LBS | HEIGHT: 70 IN | BODY MASS INDEX: 21.05 KG/M2

## 2018-03-15 DIAGNOSIS — J31.0 CHRONIC RHINITIS, UNSPECIFIED TYPE: ICD-10-CM

## 2018-03-15 DIAGNOSIS — J06.9 VIRAL URI WITH COUGH: Primary | ICD-10-CM

## 2018-03-15 PROCEDURE — 99213 OFFICE O/P EST LOW 20 MIN: CPT | Mod: S$PBB,,, | Performed by: INTERNAL MEDICINE

## 2018-03-15 PROCEDURE — 99213 OFFICE O/P EST LOW 20 MIN: CPT | Mod: PBBFAC,PN | Performed by: INTERNAL MEDICINE

## 2018-03-15 PROCEDURE — 99999 PR PBB SHADOW E&M-EST. PATIENT-LVL III: CPT | Mod: PBBFAC,,, | Performed by: INTERNAL MEDICINE

## 2018-03-15 RX ORDER — FLUTICASONE PROPIONATE 50 MCG
1 SPRAY, SUSPENSION (ML) NASAL DAILY
Qty: 16 G | Refills: 5 | Status: SHIPPED | OUTPATIENT
Start: 2018-03-15

## 2018-03-15 NOTE — PROGRESS NOTES
Assessment and Plan:    1. Viral URI with cough  Patient presenting with 4 days of typical viral URI symptoms. No signs or symptoms of bacterial superinfection to suggest need for antibiotics. Discussed the reason for not prescribing antibiotics at this time. Discussed symptomatic management with OTC meds and saline rinses (prescription medications as indicated) as in patient instructions. Patient advised to let us know if symptoms persist or if he develops any new or worsening symptoms.  Provided note to return to work.    2. Chronic rhinitis, unspecified type  - fluticasone (FLONASE) 50 mcg/actuation nasal spray; 1 spray (50 mcg total) by Each Nare route once daily.  Dispense: 16 g; Refill: 5      ______________________________________________________________________  Subjective:    Chief Complaint:  Discuss work release, URI    HPI:  Elias is a 27 y.o. year old man here to discuss URI symptoms.     Notes that he started having runny nose and cough, headache and sinus pressure. He notes that he first missed work on 3/13. He notes that he continues to have some runny nose. He has been taking nyquil nightly and finds this helpful. He denies any fever, he has not been having any trouble breathing. No ear pain.     He denies any recent seizures, but notes that this is not what he needed the work release in relation to.     Medications:  Current Outpatient Prescriptions on File Prior to Visit   Medication Sig Dispense Refill    fluticasone (FLONASE) 50 mcg/actuation nasal spray 1 spray by Each Nare route once daily. 48 g 0    levETIRAcetam (KEPPRA) 750 MG Tab Take 2 tablets (1,500 mg total) by mouth 2 (two) times daily. 360 tablet 3    OXcarbazepine (TRILEPTAL) 300 MG Tab 2-1/2 tabs PO  tablet 3    [DISCONTINUED] clonazePAM (KLONOPIN) 1 MG tablet Take 1 tablet (1 mg total) by mouth 2 (two) times daily. 60 tablet 1     No current facility-administered medications on file prior to visit.        Review of  "Systems:  Review of Systems   Constitutional: Negative for chills and fever.   HENT: Positive for congestion, postnasal drip, rhinorrhea and sneezing. Negative for ear discharge, ear pain, sinus pain, sinus pressure, sore throat and trouble swallowing.    Respiratory: Negative for shortness of breath and wheezing.    Cardiovascular: Negative for chest pain.   Neurological: Positive for light-headedness (improving). Negative for dizziness.       Past Medical History:  Past Medical History:   Diagnosis Date    Allergy     Epilepsy     Restless leg syndrome     Seizures        Objective:    Vitals:  Vitals:    03/15/18 0858   BP: 130/70   Pulse: 104   Temp: 98.3 °F (36.8 °C)   Weight: 66.7 kg (147 lb 0.8 oz)   Height: 5' 10" (1.778 m)   PainSc: 0-No pain       Physical Exam   Constitutional: He is oriented to person, place, and time. He appears well-developed and well-nourished. No distress.   HENT:   Mouth/Throat: Posterior oropharyngeal erythema present. No posterior oropharyngeal edema. Tonsils are 1+ on the right. Tonsils are 1+ on the left. No tonsillar exudate.   Eyes: No scleral icterus.   Cardiovascular: Normal rate and regular rhythm.    No murmur heard.  Pulmonary/Chest: Effort normal and breath sounds normal. No respiratory distress. He has no wheezes. He has no rales.   Neurological: He is alert and oriented to person, place, and time.   Skin: Skin is warm and dry.   Psychiatric: He has a normal mood and affect. His behavior is normal.   Vitals reviewed.      Cristina Huang MD  Internal Medicine  "

## 2018-03-15 NOTE — LETTER
March 15, 2018    Elias Lopez  [address] [place] [address]             [facility]  [address]  [place] [address]  Phone: [phone]  Fax: [phone] To whom it may concern,     Mr. Lopez was seen and evaluated for upper respiratory infection symptoms on 3/15/18. He can return to work without any restrictions at this time.       If you have any questions or concerns, please don't hesitate to call.    Sincerely,        Cristina Huang MD

## 2018-04-04 ENCOUNTER — TELEPHONE (OUTPATIENT)
Dept: FAMILY MEDICINE | Facility: CLINIC | Age: 28
End: 2018-04-04

## 2018-04-04 NOTE — TELEPHONE ENCOUNTER
----- Message from Janna Lindsay sent at 4/4/2018 10:59 AM CDT -----  Contact: self   Patient is requesting to be seen today or this week, he is having foot pain. Please call back at [phone] (home)

## 2018-04-05 ENCOUNTER — OFFICE VISIT (OUTPATIENT)
Dept: FAMILY MEDICINE | Facility: CLINIC | Age: 28
End: 2018-04-05
Payer: MEDICAID

## 2018-04-05 VITALS
BODY MASS INDEX: 20.97 KG/M2 | HEIGHT: 70 IN | DIASTOLIC BLOOD PRESSURE: 72 MMHG | TEMPERATURE: 98 F | SYSTOLIC BLOOD PRESSURE: 112 MMHG | WEIGHT: 146.5 LBS | HEART RATE: 68 BPM

## 2018-04-05 DIAGNOSIS — M79.672 LEFT FOOT PAIN: Primary | ICD-10-CM

## 2018-04-05 PROCEDURE — 99213 OFFICE O/P EST LOW 20 MIN: CPT | Mod: PBBFAC,PN | Performed by: INTERNAL MEDICINE

## 2018-04-05 PROCEDURE — 99999 PR PBB SHADOW E&M-EST. PATIENT-LVL III: CPT | Mod: PBBFAC,,, | Performed by: INTERNAL MEDICINE

## 2018-04-05 PROCEDURE — 99213 OFFICE O/P EST LOW 20 MIN: CPT | Mod: S$PBB,,, | Performed by: INTERNAL MEDICINE

## 2018-04-05 NOTE — LETTER
April 5, 2018    Elias Lopez  [address] [place] [address]             [facility]  [address] [facility] [address]  [place] [address]  Phone: [phone]  Fax: [phone] To whom it may concern    Mr. Lopez was seen in my office on 4/5/18 for foot pain. I have recommended that he stay off of his foot for the next 2 days. Unless he is able to stay off of his foot at work for the next two days, he can be excused from work.     If you have any questions or concerns, please don't hesitate to call.    Sincerely,        Cristina Huang MD

## 2018-04-05 NOTE — PROGRESS NOTES
Assessment and Plan:    1. Left foot pain  Not clear what the cause of the pain is as patient denies any injury, can not recall exactly when this started, and exam is only notable for mild TTP over mid metatarsals. Does not meet [place] foot criteria for an X-ray, and explained that it is unlikely that he broke it without any injury. Recommended that he try to stay off of it for a couple of days, use ice, NSAIDs. Provided work note per patient request.       ______________________________________________________________________  Subjective:    Chief Complaint:  Left foot pain    HPI:  Elias is a 27 y.o. year old man here for evaluation of left foot pain.     He thinks that this started hurting out of nowhere 4 days ago. Came on gradually, he can not recall any injury to the foot. Denies twisting it, denies dropping anything on it. Pain has remained the same since it started. He has not tried taking anything for the pain. He is able to walk on it, but walking causes some pain. He describes this pain as 10/10 when he is walking, but only 5-6 when he is resting. He has not tried using ice. He has not recently gotten new shoes. He denies any recent seizures that could have led to an injury he is not aware of. He notes that he is not going to be able to go to work because of this pain and would like a note.     Medications:  Current Outpatient Prescriptions on File Prior to Visit   Medication Sig Dispense Refill    fluticasone (FLONASE) 50 mcg/actuation nasal spray 1 spray (50 mcg total) by Each Nare route once daily. 16 g 5    levETIRAcetam (KEPPRA) 750 MG Tab Take 2 tablets (1,500 mg total) by mouth 2 (two) times daily. 360 tablet 3    OXcarbazepine (TRILEPTAL) 300 MG Tab 2-1/2 tabs PO  tablet 3     No current facility-administered medications on file prior to visit.        Review of Systems:  Review of Systems   Constitutional: Negative for chills and fever.   Musculoskeletal: Positive for arthralgias and gait  "problem.   Neurological: Negative for seizures and syncope.       Past Medical History:  Past Medical History:   Diagnosis Date    Allergy     Epilepsy     Restless leg syndrome     Seizures        Objective:    Vitals:  Vitals:    04/05/18 0917   BP: 112/72   Pulse: 68   Temp: 97.9 °F (36.6 °C)   Weight: 66.4 kg (146 lb 7.9 oz)   Height: 5' 10" (1.778 m)   PainSc:   8   PainLoc: Foot       Physical Exam   Constitutional: He is oriented to person, place, and time. He appears well-developed and well-nourished. No distress.   HENT:   Mouth/Throat: Oropharynx is clear and moist.   Eyes: No scleral icterus.   Cardiovascular: Normal rate and regular rhythm.    Pulmonary/Chest: Effort normal. No respiratory distress.   Musculoskeletal: He exhibits no edema.   left foot has no swelling, erythema, or bruising; mild pain with palpation of dorsal aspect of mid 2nd and 3rd metatarsals reported; normal ROM of foot and ankle, neurovascularly intact   Neurological: He is alert and oriented to person, place, and time.   Skin: Skin is warm and dry.   Psychiatric: He has a normal mood and affect. His behavior is normal.   Vitals reviewed.        Cristina Huang MD  Internal Medicine  "

## 2018-09-06 DIAGNOSIS — G40.109 TEMPORAL LOBE EPILEPSY: ICD-10-CM

## 2018-09-06 DIAGNOSIS — G40.909 NONINTRACTABLE EPILEPSY WITHOUT STATUS EPILEPTICUS, UNSPECIFIED EPILEPSY TYPE: ICD-10-CM

## 2018-09-06 RX ORDER — OXCARBAZEPINE 300 MG/1
TABLET, FILM COATED ORAL
Qty: 450 TABLET | Refills: 3 | Status: SHIPPED | OUTPATIENT
Start: 2018-09-06 | End: 2018-09-11 | Stop reason: SDUPTHER

## 2018-09-11 DIAGNOSIS — G40.109 TEMPORAL LOBE EPILEPSY: ICD-10-CM

## 2018-09-11 DIAGNOSIS — G40.909 NONINTRACTABLE EPILEPSY WITHOUT STATUS EPILEPTICUS, UNSPECIFIED EPILEPSY TYPE: ICD-10-CM

## 2018-09-11 RX ORDER — OXCARBAZEPINE 300 MG/1
TABLET, FILM COATED ORAL
Qty: 150 TABLET | Refills: 3 | Status: SHIPPED | OUTPATIENT
Start: 2018-09-11 | End: 2019-02-14 | Stop reason: SDUPTHER

## 2019-01-18 DIAGNOSIS — G40.109 TEMPORAL LOBE EPILEPSY: ICD-10-CM

## 2019-01-18 DIAGNOSIS — G40.909 NONINTRACTABLE EPILEPSY WITHOUT STATUS EPILEPTICUS, UNSPECIFIED EPILEPSY TYPE: ICD-10-CM

## 2019-01-18 RX ORDER — LEVETIRACETAM 750 MG/1
1500 TABLET ORAL 2 TIMES DAILY
Qty: 120 TABLET | Refills: 1 | Status: SHIPPED | OUTPATIENT
Start: 2019-01-18 | End: 2019-02-14 | Stop reason: SDUPTHER

## 2019-01-18 NOTE — TELEPHONE ENCOUNTER
----- Message from Luz Marina Fu sent at 1/18/2019  2:46 PM CST -----  Pharmacy is requesting a refill levETIRAcetam (KEPPRA) 750 MG Silex /   [place] [facility] [place] [facility] [address]  [address]  [place] [address]  Phone: [phone] Fax: [phone]

## 2019-01-22 ENCOUNTER — OFFICE VISIT (OUTPATIENT)
Dept: DERMATOLOGY | Facility: CLINIC | Age: 29
End: 2019-01-22
Payer: MEDICAID

## 2019-01-22 DIAGNOSIS — D22.9 MULTIPLE BENIGN NEVI: Primary | ICD-10-CM

## 2019-01-22 DIAGNOSIS — D18.01 CHERRY ANGIOMA: ICD-10-CM

## 2019-01-22 DIAGNOSIS — L82.1 SEBORRHEIC KERATOSES: ICD-10-CM

## 2019-01-22 PROCEDURE — 99202 OFFICE O/P NEW SF 15 MIN: CPT | Mod: S$PBB,,, | Performed by: DERMATOLOGY

## 2019-01-22 PROCEDURE — 99999 PR PBB SHADOW E&M-EST. PATIENT-LVL II: ICD-10-PCS | Mod: PBBFAC,,, | Performed by: DERMATOLOGY

## 2019-01-22 PROCEDURE — 99202 PR OFFICE/OUTPT VISIT, NEW, LEVL II, 15-29 MIN: ICD-10-PCS | Mod: S$PBB,,, | Performed by: DERMATOLOGY

## 2019-01-22 PROCEDURE — 99212 OFFICE O/P EST SF 10 MIN: CPT | Mod: PBBFAC,PO | Performed by: DERMATOLOGY

## 2019-01-22 PROCEDURE — 99999 PR PBB SHADOW E&M-EST. PATIENT-LVL II: CPT | Mod: PBBFAC,,, | Performed by: DERMATOLOGY

## 2019-01-22 NOTE — PROGRESS NOTES
"  Subjective:       Patient ID:  Elias Lopez is a 28 y.o. male who presents for   Chief Complaint   Patient presents with    Spot     R abdomen and R arm     Initial visit  No h/o skin cancer  H/o mole removed in the past "burned it off with a laser", unknown path, cannot remember provider  Works at [employer]    Current Outpatient Medications:     fluticasone (FLONASE) 50 mcg/actuation nasal spray, 1 spray (50 mcg total) by Each Nare route once daily., Disp: 16 g, Rfl: 5    levETIRAcetam (KEPPRA) 750 MG Tab, Take 2 tablets (1,500 mg total) by mouth 2 (two) times daily., Disp: 120 tablet, Rfl: 1    OXcarbazepine (TRILEPTAL) 300 MG Tab, 2-1/2 tabs PO BID, Disp: 150 tablet, Rfl: 3        Spot  - Initial  Affected locations: R arm and abdomen.  Duration: 3 months  Signs / symptoms: asymptomatic  Severity: mild  Timing: constant  Aggravated by: nothing  Relieving factors/Treatments tried: nothing        Review of Systems   Constitutional: Negative for fever, chills and fatigue.   Skin: Positive for activity-related sunscreen use. Negative for daily sunscreen use and wears hat.   Hematologic/Lymphatic: Does not bruise/bleed easily.        Objective:    Physical Exam   Constitutional: He appears well-developed and well-nourished. No distress.   Neurological: He is alert and oriented to person, place, and time. He is not disoriented.   Psychiatric: He has a normal mood and affect.   Skin:   Areas Examined (abnormalities noted in diagram):   Head / Face Inspection Performed  Neck Inspection Performed  Chest / Axilla Inspection Performed  Back Inspection Performed  RUE Inspected  LUE Inspection Performed                   Diagram Legend     Erythematous scaling macule/papule c/w actinic keratosis       Vascular papule c/w angioma      Pigmented verrucoid papule/plaque c/w seborrheic keratosis      Yellow umbilicated papule c/w sebaceous hyperplasia      Irregularly shaped tan macule c/w lentigo     1-2 mm smooth " white papules consistent with Milia      Movable subcutaneous cyst with punctum c/w epidermal inclusion cyst      Subcutaneous movable cyst c/w pilar cyst      Firm pink to brown papule c/w dermatofibroma      Pedunculated fleshy papule(s) c/w skin tag(s)      Evenly pigmented macule c/w junctional nevus     Mildly variegated pigmented, slightly irregular-bordered macule c/w mildly atypical nevus      Flesh colored to evenly pigmented papule c/w intradermal nevus       Pink pearly papule/plaque c/w basal cell carcinoma      Erythematous hyperkeratotic cursted plaque c/w SCC      Surgical scar with no sign of skin cancer recurrence      Open and closed comedones      Inflammatory papules and pustules      Verrucoid papule consistent consistent with wart     Erythematous eczematous patches and plaques     Dystrophic onycholytic nail with subungual debris c/w onychomycosis     Umbilicated papule    Erythematous-base heme-crusted tan verrucoid plaque consistent with inflamed seborrheic keratosis     Erythematous Silvery Scaling Plaque c/w Psoriasis     See annotation      Assessment / Plan:        Multiple benign nevi  Discussed ABCDE's of nevi.  Monitor for new mole or moles that are becoming bigger, darker, irritated, or developing irregular borders. Brochure provided.    Seborrheic keratoses  These are benign inherited growths without a malignant potential. Reassurance given to patient. No treatment is necessary.     Cherry angioma  This is a benign vascular lesion. Reassurance given. No treatment required.     Patient instructed in importance in daily sun protection of at least spf 30. Mineral sunscreen ingredients preferred (Zinc +/- Titanium).   Recommend Elta MD for daily use on face and neck.  Patient encouraged to wear hat for all outdoor exposure.   Also discussed sun avoidance and use of protective clothing.         Follow-up if symptoms worsen or fail to improve.

## 2019-02-14 ENCOUNTER — OFFICE VISIT (OUTPATIENT)
Dept: NEUROLOGY | Facility: CLINIC | Age: 29
End: 2019-02-14
Payer: MEDICAID

## 2019-02-14 ENCOUNTER — TELEPHONE (OUTPATIENT)
Dept: NEUROLOGY | Facility: CLINIC | Age: 29
End: 2019-02-14

## 2019-02-14 VITALS
BODY MASS INDEX: 22.33 KG/M2 | DIASTOLIC BLOOD PRESSURE: 73 MMHG | RESPIRATION RATE: 18 BRPM | HEART RATE: 84 BPM | WEIGHT: 156 LBS | HEIGHT: 70 IN | SYSTOLIC BLOOD PRESSURE: 121 MMHG

## 2019-02-14 DIAGNOSIS — G40.019 LOCALIZATION-RELATED (FOCAL) (PARTIAL) IDIOPATHIC EPILEPSY AND EPILEPTIC SYNDROMES WITH SEIZURES OF LOCALIZED ONSET, INTRACTABLE, WITHOUT STATUS EPILEPTICUS: Primary | ICD-10-CM

## 2019-02-14 DIAGNOSIS — G40.109 TEMPORAL LOBE EPILEPSY: ICD-10-CM

## 2019-02-14 DIAGNOSIS — G40.909 NONINTRACTABLE EPILEPSY WITHOUT STATUS EPILEPTICUS, UNSPECIFIED EPILEPSY TYPE: ICD-10-CM

## 2019-02-14 DIAGNOSIS — G25.71 AKATHISIA: ICD-10-CM

## 2019-02-14 PROCEDURE — 99213 OFFICE O/P EST LOW 20 MIN: CPT | Mod: S$PBB,,, | Performed by: PSYCHIATRY & NEUROLOGY

## 2019-02-14 PROCEDURE — 99999 PR PBB SHADOW E&M-EST. PATIENT-LVL III: ICD-10-PCS | Mod: PBBFAC,,, | Performed by: PSYCHIATRY & NEUROLOGY

## 2019-02-14 PROCEDURE — 99213 OFFICE O/P EST LOW 20 MIN: CPT | Mod: PBBFAC,PN | Performed by: PSYCHIATRY & NEUROLOGY

## 2019-02-14 PROCEDURE — 99213 PR OFFICE/OUTPT VISIT, EST, LEVL III, 20-29 MIN: ICD-10-PCS | Mod: S$PBB,,, | Performed by: PSYCHIATRY & NEUROLOGY

## 2019-02-14 PROCEDURE — 99999 PR PBB SHADOW E&M-EST. PATIENT-LVL III: CPT | Mod: PBBFAC,,, | Performed by: PSYCHIATRY & NEUROLOGY

## 2019-02-14 RX ORDER — OXCARBAZEPINE 300 MG/1
TABLET, FILM COATED ORAL
Qty: 150 TABLET | Refills: 3 | Status: SHIPPED | OUTPATIENT
Start: 2019-02-14 | End: 2019-05-22 | Stop reason: SDUPTHER

## 2019-02-14 RX ORDER — LEVETIRACETAM 750 MG/1
1500 TABLET ORAL 2 TIMES DAILY
Qty: 120 TABLET | Refills: 1 | Status: SHIPPED | OUTPATIENT
Start: 2019-02-14 | End: 2019-05-22 | Stop reason: SDUPTHER

## 2019-02-14 NOTE — PROGRESS NOTES
Date of service:  2/14/2019    Chief complaint:  Seizures, questions about DBS    Interval history:  The patient is a 28 y.o. male seen previously for partial onset seizures.  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  I last saw him a bit over a year ago.  Since the last time he was here, he been taking Keppra 1500 mg BID to his Trileptal 750 mg BID.  He notes no side effects.  He reports no further seizures.  Regarding his movement disorder, the patient has also not been following with Dr. Echevarria.  It appears that he has not been seen in movement disorder clinic since 2015.  Dr. Echevarria had been trying him on Onfi.  When I last saw him, his PCP had switched him to Klonopin.  Today, his medication list mentions no medication for his movement disorder.  The patient has no new complaints.      Past Medical History:   Diagnosis Date    Allergy     Epilepsy     Restless leg syndrome     Seizures        Past Surgical History:   Procedure Laterality Date    LOBECTOMY FOR SEIZURE FOCUS      temoral         Family History   Problem Relation Age of Onset    Seizures Paternal Grandmother     Cancer Paternal Grandmother     Emphysema Maternal Grandmother     Diabetes Maternal Grandmother     Tuberculosis Maternal Grandmother     Diabetes Maternal Grandfather     Arthritis Paternal Grandfather     Melanoma Neg Hx     Psoriasis Neg Hx     Lupus Neg Hx     Eczema Neg Hx        Social History     Socioeconomic History    Marital status: Single     Spouse name: None    Number of children: None    Years of education: None    Highest education level: None   Social Needs    Financial resource strain: None    Food insecurity - worry: None    Food insecurity - inability: None    Transportation needs - medical: None    Transportation needs - non-medical: None   Occupational History    None   Tobacco Use    Smoking status: Never Smoker    Smokeless  "tobacco: Never Used   Substance and Sexual Activity    Alcohol use: No    Drug use: No    Sexual activity: Not Currently   Other Topics Concern    None   Social History Narrative    Currently not working, looking for a job since moving back to [place].        Review of systems not significantly changed from previous visit except as noted above.    /73   Pulse 84   Resp 18   Ht 5' 10" (1.778 m)   Wt 70.8 kg (156 lb)   BMI 22.38 kg/m²   Physical exam not significantly changed from previous visit aside from notable worsening in his left arm movements relative to our last visit.    Data base:  Chart reviewed.      Labs (12/17):  CMP: notable for normal sodium and LFTs  CBC: unremarkable    MRI of the brain (1/15):   "1. Postoperative right temporal encephalomalacia.  2. Old right periventricular lacunar infarct."   I independently visualized and interpreted this study.     DEXA (12/17):  "A T score of -0.5 is noted which is not suggestive of osteoporosis or osteopenia)."    Assessment and plan:  The patient is a 28 y.o. male who returns for follow up on partial onset seizures, improved after temporal lobectomy.  We will continue his Trileptal at 750mg BID and Keppra 1500 mg BID.  We will check a DEXA in/around late 2019, as Trileptal may reduce bone density.  We will check labs for medication monitoring purposes.  Medication side effects, seizure safety, and driving laws were reviewed.      Regarding his left arm movements, these were better today than I recall them being at our last visit.  He was following with Dr. Echevarria for this issue but has not seen him recently.  He did not pursue DBS surgery.  At this point, he is not on any medication for his movement disorder, and this issue has worsened relative to the last time I saw him.  I have placed a referral back to Dr. Echevarria, as I think this gives the patient the best chance for improvement on this front.    I will see him back in about a " year.

## 2019-02-27 ENCOUNTER — LAB VISIT (OUTPATIENT)
Dept: LAB | Facility: HOSPITAL | Age: 29
End: 2019-02-27
Attending: PSYCHIATRY & NEUROLOGY
Payer: MEDICAID

## 2019-02-27 DIAGNOSIS — G40.019 LOCALIZATION-RELATED (FOCAL) (PARTIAL) IDIOPATHIC EPILEPSY AND EPILEPTIC SYNDROMES WITH SEIZURES OF LOCALIZED ONSET, INTRACTABLE, WITHOUT STATUS EPILEPTICUS: ICD-10-CM

## 2019-02-27 LAB
ALBUMIN SERPL BCP-MCNC: 4 G/DL
ALP SERPL-CCNC: 115 U/L
ALT SERPL W/O P-5'-P-CCNC: 21 U/L
ANION GAP SERPL CALC-SCNC: 5 MMOL/L
AST SERPL-CCNC: 20 U/L
BASOPHILS # BLD AUTO: 0.04 K/UL
BASOPHILS NFR BLD: 0.5 %
BILIRUB SERPL-MCNC: 0.4 MG/DL
BUN SERPL-MCNC: 11 MG/DL
CALCIUM SERPL-MCNC: 9.2 MG/DL
CHLORIDE SERPL-SCNC: 106 MMOL/L
CO2 SERPL-SCNC: 32 MMOL/L
CREAT SERPL-MCNC: 0.9 MG/DL
DIFFERENTIAL METHOD: ABNORMAL
EOSINOPHIL # BLD AUTO: 0.5 K/UL
EOSINOPHIL NFR BLD: 7.3 %
ERYTHROCYTE [DISTWIDTH] IN BLOOD BY AUTOMATED COUNT: 11.6 %
EST. GFR  (AFRICAN AMERICAN): >60 ML/MIN/1.73 M^2
EST. GFR  (NON AFRICAN AMERICAN): >60 ML/MIN/1.73 M^2
GLUCOSE SERPL-MCNC: 86 MG/DL
HCT VFR BLD AUTO: 43.1 %
HGB BLD-MCNC: 14.7 G/DL
IMM GRANULOCYTES # BLD AUTO: 0.01 K/UL
IMM GRANULOCYTES NFR BLD AUTO: 0.1 %
LYMPHOCYTES # BLD AUTO: 2.2 K/UL
LYMPHOCYTES NFR BLD: 30.2 %
MCH RBC QN AUTO: 31.4 PG
MCHC RBC AUTO-ENTMCNC: 34.1 G/DL
MCV RBC AUTO: 92 FL
MONOCYTES # BLD AUTO: 0.7 K/UL
MONOCYTES NFR BLD: 8.8 %
NEUTROPHILS # BLD AUTO: 3.9 K/UL
NEUTROPHILS NFR BLD: 53.1 %
NRBC BLD-RTO: 0 /100 WBC
PLATELET # BLD AUTO: 189 K/UL
PMV BLD AUTO: 11.2 FL
POTASSIUM SERPL-SCNC: 3.9 MMOL/L
PROT SERPL-MCNC: 7.2 G/DL
RBC # BLD AUTO: 4.68 M/UL
SODIUM SERPL-SCNC: 143 MMOL/L
WBC # BLD AUTO: 7.36 K/UL

## 2019-02-27 PROCEDURE — 80053 COMPREHEN METABOLIC PANEL: CPT

## 2019-02-27 PROCEDURE — 80183 DRUG SCRN QUANT OXCARBAZEPIN: CPT

## 2019-02-27 PROCEDURE — 85025 COMPLETE CBC W/AUTO DIFF WBC: CPT

## 2019-03-02 LAB — OXCARBAZEPINE METABOLITE: 22 MCG/ML

## 2019-05-22 DIAGNOSIS — G40.109 TEMPORAL LOBE EPILEPSY: ICD-10-CM

## 2019-05-22 DIAGNOSIS — G40.909 NONINTRACTABLE EPILEPSY WITHOUT STATUS EPILEPTICUS, UNSPECIFIED EPILEPSY TYPE: ICD-10-CM

## 2019-05-22 RX ORDER — OXCARBAZEPINE 300 MG/1
TABLET, FILM COATED ORAL
Qty: 150 TABLET | Refills: 8 | Status: SHIPPED | OUTPATIENT
Start: 2019-05-22 | End: 2020-01-27

## 2019-05-22 RX ORDER — LEVETIRACETAM 750 MG/1
1500 TABLET ORAL 2 TIMES DAILY
Qty: 120 TABLET | Refills: 8 | Status: SHIPPED | OUTPATIENT
Start: 2019-05-22 | End: 2020-02-20 | Stop reason: SDUPTHER

## 2019-11-21 ENCOUNTER — TELEPHONE (OUTPATIENT)
Dept: FAMILY MEDICINE | Facility: CLINIC | Age: 29
End: 2019-11-21

## 2019-11-21 NOTE — TELEPHONE ENCOUNTER
notified  requesting medication to help him sleep/relax tonight. Per chart  is a 1-2 bottle per night drinker of etoh.  states he will review the chart and place orders. ----- Message from Claudette Davila sent at 11/21/2019 12:10 PM CST -----  Contact: Patient  Type:  Same Day Appointment Request    Caller is requesting a same day appointment.  Caller declined first available appointment listed below.      Name of Caller:  Patient  When is the first available appointment?  No avail appts came up/ he is an established Medicaid patient  Symptoms:  pain in both feet/ mainly left  Best Call Back Number:    Additional Information:   Calling to schedule a same day appt today, if possible

## 2019-11-22 ENCOUNTER — OFFICE VISIT (OUTPATIENT)
Dept: FAMILY MEDICINE | Facility: CLINIC | Age: 29
End: 2019-11-22
Payer: MEDICAID

## 2019-11-22 VITALS
RESPIRATION RATE: 18 BRPM | WEIGHT: 171.44 LBS | HEART RATE: 87 BPM | OXYGEN SATURATION: 96 % | BODY MASS INDEX: 24.54 KG/M2 | DIASTOLIC BLOOD PRESSURE: 72 MMHG | HEIGHT: 70 IN | SYSTOLIC BLOOD PRESSURE: 116 MMHG | TEMPERATURE: 98 F

## 2019-11-22 DIAGNOSIS — M72.2 PLANTAR FASCIITIS OF LEFT FOOT: Primary | ICD-10-CM

## 2019-11-22 PROCEDURE — 99999 PR PBB SHADOW E&M-EST. PATIENT-LVL III: ICD-10-PCS | Mod: PBBFAC,,, | Performed by: INTERNAL MEDICINE

## 2019-11-22 PROCEDURE — 99213 OFFICE O/P EST LOW 20 MIN: CPT | Mod: PBBFAC,PN | Performed by: INTERNAL MEDICINE

## 2019-11-22 PROCEDURE — 99214 OFFICE O/P EST MOD 30 MIN: CPT | Mod: S$PBB,,, | Performed by: INTERNAL MEDICINE

## 2019-11-22 PROCEDURE — 99214 PR OFFICE/OUTPT VISIT, EST, LEVL IV, 30-39 MIN: ICD-10-PCS | Mod: S$PBB,,, | Performed by: INTERNAL MEDICINE

## 2019-11-22 PROCEDURE — 99999 PR PBB SHADOW E&M-EST. PATIENT-LVL III: CPT | Mod: PBBFAC,,, | Performed by: INTERNAL MEDICINE

## 2019-11-22 RX ORDER — NAPROXEN 500 MG/1
500 TABLET ORAL 2 TIMES DAILY
Qty: 14 TABLET | Refills: 0 | Status: SHIPPED | OUTPATIENT
Start: 2019-11-22 | End: 2019-11-29

## 2019-11-22 NOTE — PROGRESS NOTES
Assessment and Plan:    1. Plantar fasciitis of left foot  Although exam is unremarkable, symptoms as described are consistent with PF. We discussed stretches and activity modification and he was provided with a handout of these topics. Will try higher dose anti-inflammatory for 1 week, then back to PRN. Return if not improving.  - naproxen (NAPROSYN) 500 MG tablet; Take 1 tablet (500 mg total) by mouth 2 (two) times daily. for 7 days  Dispense: 14 tablet; Refill: 0      ______________________________________________________________________  Subjective:    Chief Complaint:  Left foot pain    HPI:  Elias is a 29 y.o. year old male here to discuss left sided foot pain.     This current pain started about a week ago. He denies any injury. He had not been walking or standing more than usual, but does walk and stand all day for work. The pain is located on the bottom of his foot from his heel to the mid foot. The pain is worst upon waking up and after walking a lot. NSAIDs do help with the pain, he has been taking 400 mg ibuprofen each morning.    Recall from previous note: I had seen him for left sided foot pain that started without injury in April 2018. At the time the pain was non-localized pain over metatarsals. Did not meet criteria for XR. Recommended rest, ice, and NSAIDs. He reports that with rest and NSAIDs the pain improved, but he is not sure how long this took.     Medications:  Current Outpatient Medications on File Prior to Visit   Medication Sig Dispense Refill    fluticasone (FLONASE) 50 mcg/actuation nasal spray 1 spray (50 mcg total) by Each Nare route once daily. 16 g 5    levETIRAcetam (KEPPRA) 750 MG Tab Take 2 tablets (1,500 mg total) by mouth 2 (two) times daily. 120 tablet 8    OXcarbazepine (TRILEPTAL) 300 MG Tab 2-1/2 tabs PO  tablet 8     No current facility-administered medications on file prior to visit.        Review of Systems:  Review of Systems   Constitutional: Negative for  "activity change, chills and fever.   Musculoskeletal: Positive for arthralgias. Negative for gait problem.   Neurological: Negative for dizziness and light-headedness.       Past Medical History:  Past Medical History:   Diagnosis Date    Allergy     Epilepsy     Restless leg syndrome     Seizures        Objective:    Vitals:  Vitals:    11/22/19 1304   BP: 116/72   Pulse: 87   Resp: 18   Temp: 98.3 °F (36.8 °C)   TempSrc: Oral   SpO2: 96%   Weight: 77.7 kg (171 lb 6.5 oz)   Height: 5' 10" (1.778 m)   PainSc: 10-Worst pain ever   PainLoc: Foot       Physical Exam   Constitutional: He is oriented to person, place, and time. He appears well-developed and well-nourished. No distress.   HENT:   Mouth/Throat: Oropharynx is clear and moist.   Eyes: Conjunctivae are normal.   Cardiovascular: Normal rate and regular rhythm.   Pulmonary/Chest: Effort normal. No respiratory distress.   Musculoskeletal:   no tenderness to palpation over any part of left foot including heel and in the area of the plantar fascia, no deformity, no limitation in ROM   Neurological: He is alert and oriented to person, place, and time.   Skin: Skin is warm and dry.   Psychiatric: He has a normal mood and affect. His behavior is normal.   Vitals reviewed.      Data:  Previous labs reviewed and pertinent for Cr normal in Feb.      Cristina Huang MD  Internal Medicine  "

## 2020-01-08 ENCOUNTER — HOSPITAL ENCOUNTER (EMERGENCY)
Facility: HOSPITAL | Age: 30
Discharge: HOME | End: 2020-01-08
Attending: EMERGENCY MEDICINE
Payer: MEDICAID

## 2020-01-08 VITALS
BODY MASS INDEX: 24.34 KG/M2 | HEART RATE: 81 BPM | WEIGHT: 170 LBS | TEMPERATURE: 98 F | DIASTOLIC BLOOD PRESSURE: 81 MMHG | OXYGEN SATURATION: 98 % | SYSTOLIC BLOOD PRESSURE: 136 MMHG | RESPIRATION RATE: 17 BRPM | HEIGHT: 70 IN
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
## 2020-01-08 DIAGNOSIS — H92.02 LEFT EAR PAIN: ICD-10-CM

## 2020-01-08 PROCEDURE — 99283 EMERGENCY DEPT VISIT LOW MDM: CPT

## 2020-01-09 ENCOUNTER — HOSPITAL ENCOUNTER (EMERGENCY)
Facility: HOSPITAL | Age: 30
Discharge: HOME | End: 2020-01-09
Attending: EMERGENCY MEDICINE
Payer: MEDICAID

## 2020-01-09 VITALS
TEMPERATURE: 99 F | BODY MASS INDEX: 24.34 KG/M2 | HEART RATE: 81 BPM | OXYGEN SATURATION: 97 % | SYSTOLIC BLOOD PRESSURE: 119 MMHG | RESPIRATION RATE: 14 BRPM | WEIGHT: 170 LBS | DIASTOLIC BLOOD PRESSURE: 81 MMHG | HEIGHT: 70 IN
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
## 2020-01-09 DIAGNOSIS — S16.1XXA STRAIN OF NECK MUSCLE, INITIAL ENCOUNTER: ICD-10-CM

## 2020-01-09 DIAGNOSIS — S20.212A CHEST WALL CONTUSION, LEFT, INITIAL ENCOUNTER: ICD-10-CM

## 2020-01-09 DIAGNOSIS — S40.012A CONTUSION OF LEFT SHOULDER, INITIAL ENCOUNTER: ICD-10-CM

## 2020-01-09 PROCEDURE — 99283 EMERGENCY DEPT VISIT LOW MDM: CPT | Mod: 25

## 2020-01-09 PROCEDURE — 25000003 PHARM REV CODE 250: Performed by: EMERGENCY MEDICINE

## 2020-01-09 RX ORDER — IBUPROFEN 400 MG/1
800 TABLET ORAL
Status: COMPLETED | OUTPATIENT
Start: 2020-01-09 | End: 2020-01-09

## 2020-01-09 RX ORDER — IBUPROFEN 600 MG/1
600 TABLET ORAL EVERY 6 HOURS PRN
Qty: 20 TABLET | Refills: 0 | Status: SHIPPED | OUTPATIENT
Start: 2020-01-09

## 2020-01-09 RX ADMIN — IBUPROFEN 800 MG: 400 TABLET, FILM COATED ORAL at 02:01
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
## 2020-01-09 NOTE — ED NOTES
Given written and verbal DC instructions questions answered per MD aware to follow up with PCP encouraged to return if needed. Given per MD and RN
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
## 2020-01-27 DIAGNOSIS — G40.109 TEMPORAL LOBE EPILEPSY: ICD-10-CM

## 2020-01-27 DIAGNOSIS — G40.909 NONINTRACTABLE EPILEPSY WITHOUT STATUS EPILEPTICUS, UNSPECIFIED EPILEPSY TYPE: ICD-10-CM

## 2020-01-27 RX ORDER — OXCARBAZEPINE 300 MG/1
TABLET, FILM COATED ORAL
Qty: 150 TABLET | Refills: 8 | Status: SHIPPED | OUTPATIENT
Start: 2020-01-27 | End: 2020-01-29 | Stop reason: SDUPTHER

## 2020-01-29 DIAGNOSIS — G40.109 TEMPORAL LOBE EPILEPSY: ICD-10-CM

## 2020-01-29 DIAGNOSIS — G40.909 NONINTRACTABLE EPILEPSY WITHOUT STATUS EPILEPTICUS, UNSPECIFIED EPILEPSY TYPE: ICD-10-CM

## 2020-01-29 RX ORDER — OXCARBAZEPINE 300 MG/1
TABLET, FILM COATED ORAL
Qty: 150 TABLET | Refills: 8 | Status: SHIPPED | OUTPATIENT
Start: 2020-01-29 | End: 2020-02-03 | Stop reason: SDUPTHER

## 2020-02-03 DIAGNOSIS — G40.109 TEMPORAL LOBE EPILEPSY: ICD-10-CM

## 2020-02-03 DIAGNOSIS — G40.909 NONINTRACTABLE EPILEPSY WITHOUT STATUS EPILEPTICUS, UNSPECIFIED EPILEPSY TYPE: ICD-10-CM

## 2020-02-03 RX ORDER — OXCARBAZEPINE 300 MG/1
750 TABLET, FILM COATED ORAL 2 TIMES DAILY
Qty: 150 TABLET | Refills: 2 | Status: SHIPPED | OUTPATIENT
Start: 2020-02-03 | End: 2020-02-17 | Stop reason: SDUPTHER

## 2020-02-03 NOTE — TELEPHONE ENCOUNTER
----- Message from Chaz Gray sent at 2/1/2020 10:18 AM CST -----  Contact: pt  Type:  RX Refill Request    Who Called:  pt  Refill or New Rx:  refill  RX Name and Strength:  OXcarbazepine (TRILEPTAL) 300 MG Tab  How is the patient currently taking it? (ex. 1XDay):  2-1/2 tabs PO BID  Is this a 30 day or 90 day RX:  150 tablet  Preferred Pharmacy with phone number:    [place], [place] - [address] [address]  [address]  [facility] [address]  Phone: [phone] Fax: [phone]      Local or Mail Order:  local  Ordering Provider:  Dr. Jonh Wolf Call Back Number:  [phone]  Additional Information:

## 2020-02-12 ENCOUNTER — TELEPHONE (OUTPATIENT)
Dept: FAMILY MEDICINE | Facility: CLINIC | Age: 30
End: 2020-02-12

## 2020-02-12 NOTE — TELEPHONE ENCOUNTER
----- Message from Elsy Valdivia sent at 2/12/2020 12:36 PM CST -----  Contact: [facility]  Type: Needs Medical Advice    Who Called:  Pharmacy  Pharmacy name and phone #:    [facility] - [place] - [address]  [address]  [place] [address]  Phone: [phone] Fax: [phone]  Best Call Back Number: [phone]  Additional Information: Per pharmacy has been faxing and calling over the past few weeks and received no response regarding refill request for patient-please advise-thank you

## 2020-02-17 DIAGNOSIS — G40.909 NONINTRACTABLE EPILEPSY WITHOUT STATUS EPILEPTICUS, UNSPECIFIED EPILEPSY TYPE: ICD-10-CM

## 2020-02-17 DIAGNOSIS — G40.109 TEMPORAL LOBE EPILEPSY: ICD-10-CM

## 2020-02-17 RX ORDER — OXCARBAZEPINE 300 MG/1
750 TABLET, FILM COATED ORAL 2 TIMES DAILY
Qty: 150 TABLET | Refills: 2 | Status: SHIPPED | OUTPATIENT
Start: 2020-02-17 | End: 2020-05-27

## 2020-02-20 DIAGNOSIS — G40.909 NONINTRACTABLE EPILEPSY WITHOUT STATUS EPILEPTICUS, UNSPECIFIED EPILEPSY TYPE: ICD-10-CM

## 2020-02-20 DIAGNOSIS — G40.109 TEMPORAL LOBE EPILEPSY: ICD-10-CM

## 2020-02-20 NOTE — TELEPHONE ENCOUNTER
----- Message from Wesley Skelton sent at 2/20/2020  1:28 PM CST -----  Contact: Sneha with [facility]  Type:  Pharmacy Calling to Clarify an RX    Name of Caller:  sneha  Pharmacy Name:    [facility] - [place], [place] - [address]  [address]  [place] [address]  Phone: [phone] Fax: [phone]    Prescription Name:    1. levETIRAcetam (KEPPRA) 750 MG Tab       Sig - Route: Take 2 tablets (1,500 mg total) by mouth 2 (two) times daily. - Oral     2. OXcarbazepine (TRILEPTAL) 300 MG Tab       Sig - Route: Take 2.5 tablets (750 mg total) by mouth 2 (two) times daily. 2-1/2 tabs PO BID - Oral     What do they need to clarify?:  Looking for refills on 2 above Rx  Best Call Back Number:  [phone]  Additional Information:  States have been trying by e-scribe for days

## 2020-02-20 NOTE — TELEPHONE ENCOUNTER
Returned call to Iggy at pt preferred pharmacy and confirmed both meds with two refills per each. Verbalized understanding.

## 2020-02-21 RX ORDER — LEVETIRACETAM 750 MG/1
1500 TABLET ORAL 2 TIMES DAILY
Qty: 120 TABLET | Refills: 2 | Status: SHIPPED | OUTPATIENT
Start: 2020-02-21 | End: 2020-05-29 | Stop reason: SDUPTHER

## 2020-05-27 ENCOUNTER — TELEPHONE (OUTPATIENT)
Dept: NEUROLOGY | Facility: CLINIC | Age: 30
End: 2020-05-27

## 2020-05-27 DIAGNOSIS — G40.909 NONINTRACTABLE EPILEPSY WITHOUT STATUS EPILEPTICUS, UNSPECIFIED EPILEPSY TYPE: ICD-10-CM

## 2020-05-27 DIAGNOSIS — G40.109 TEMPORAL LOBE EPILEPSY: ICD-10-CM

## 2020-05-27 RX ORDER — OXCARBAZEPINE 300 MG/1
750 TABLET, FILM COATED ORAL 2 TIMES DAILY
Qty: 150 TABLET | Refills: 2 | Status: SHIPPED | OUTPATIENT
Start: 2020-05-27 | End: 2020-05-29 | Stop reason: SDUPTHER

## 2020-05-27 NOTE — TELEPHONE ENCOUNTER
----- Message from Arnav Jeffers sent at 5/27/2020  1:48 PM CDT -----  Contact: Patient  Type:  RX Refill Request    Who Called:  Patient  Refill or New Rx:  Refill  RX Name and Strength:  OXcarbazepine (TRILEPTAL) 300 MG Tab  How is the patient currently taking it? (ex. 1XDay):  As ordered  Is this a 30 day or 90 day RX:  90  Preferred Pharmacy with phone number:    [facility] [address]  [address]  [facility] [address]  Phone: [phone] Fax: [phone]  Local or Mail Order:  Local  Ordering Provider:  Dr. Jonh Wolf Call Back Number:  [phone]  Additional Information:  ABIDA

## 2020-05-29 ENCOUNTER — TELEPHONE (OUTPATIENT)
Dept: NEUROLOGY | Facility: CLINIC | Age: 30
End: 2020-05-29

## 2020-05-29 ENCOUNTER — OFFICE VISIT (OUTPATIENT)
Dept: NEUROLOGY | Facility: CLINIC | Age: 30
End: 2020-05-29
Payer: MEDICAID

## 2020-05-29 VITALS
BODY MASS INDEX: 24.57 KG/M2 | TEMPERATURE: 99 F | SYSTOLIC BLOOD PRESSURE: 114 MMHG | HEIGHT: 70 IN | HEART RATE: 67 BPM | DIASTOLIC BLOOD PRESSURE: 76 MMHG | WEIGHT: 171.63 LBS

## 2020-05-29 DIAGNOSIS — G40.909 NONINTRACTABLE EPILEPSY WITHOUT STATUS EPILEPTICUS, UNSPECIFIED EPILEPSY TYPE: ICD-10-CM

## 2020-05-29 DIAGNOSIS — R25.9 ABNORMAL INVOLUNTARY MOVEMENTS: ICD-10-CM

## 2020-05-29 DIAGNOSIS — G40.109 TEMPORAL LOBE EPILEPSY: Primary | ICD-10-CM

## 2020-05-29 DIAGNOSIS — R25.9 INVOLUNTARY MOVEMENTS: ICD-10-CM

## 2020-05-29 DIAGNOSIS — Z79.899 HIGH RISK MEDICATION USE: ICD-10-CM

## 2020-05-29 PROCEDURE — 99213 OFFICE O/P EST LOW 20 MIN: CPT | Mod: S$PBB,,, | Performed by: PSYCHIATRY & NEUROLOGY

## 2020-05-29 PROCEDURE — 99213 OFFICE O/P EST LOW 20 MIN: CPT | Mod: PBBFAC,PN | Performed by: PSYCHIATRY & NEUROLOGY

## 2020-05-29 PROCEDURE — 99213 PR OFFICE/OUTPT VISIT, EST, LEVL III, 20-29 MIN: ICD-10-PCS | Mod: S$PBB,,, | Performed by: PSYCHIATRY & NEUROLOGY

## 2020-05-29 PROCEDURE — 99999 PR PBB SHADOW E&M-EST. PATIENT-LVL III: ICD-10-PCS | Mod: PBBFAC,,, | Performed by: PSYCHIATRY & NEUROLOGY

## 2020-05-29 PROCEDURE — 99999 PR PBB SHADOW E&M-EST. PATIENT-LVL III: CPT | Mod: PBBFAC,,, | Performed by: PSYCHIATRY & NEUROLOGY

## 2020-05-29 RX ORDER — LEVETIRACETAM 750 MG/1
1500 TABLET ORAL 2 TIMES DAILY
Qty: 360 TABLET | Refills: 3 | Status: SHIPPED | OUTPATIENT
Start: 2020-05-29 | End: 2021-04-28

## 2020-05-29 RX ORDER — OXCARBAZEPINE 300 MG/1
750 TABLET, FILM COATED ORAL 2 TIMES DAILY
Qty: 450 TABLET | Refills: 3 | Status: SHIPPED | OUTPATIENT
Start: 2020-05-29 | End: 2020-06-09

## 2020-05-29 NOTE — PROGRESS NOTES
Date of service:  5/29/2020    Chief complaint:  Seizures    Interval history:  The patient is a 29 y.o. male seen previously for partial onset seizures.  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  I last saw him a bit over a year ago.  Since the last time he was here, he been taking Keppra 1500 mg BID to his Trileptal 750 mg BID.  He notes no side effects.  He reports no further seizures.  Regarding his movement disorder, the patient has also not been following with Dr. Echevarria.  It appears that he has not been seen in movement disorder clinic since 2015.  Dr. Echevarria had been trying him on Onfi.  His PCP had subsequently switched him to Klonopin.  Today, his medication list mentions no medication for his movement disorder.  He feels that this is worse than in the past.  The patient has no new complaints.      Past Medical History:   Diagnosis Date    Allergy     Epilepsy     Restless leg syndrome     Seizures        Past Surgical History:   Procedure Laterality Date    LOBECTOMY FOR SEIZURE FOCUS      temoral         Family History   Problem Relation Age of Onset    Seizures Paternal Grandmother     Cancer Paternal Grandmother     Emphysema Maternal Grandmother     Diabetes Maternal Grandmother     Tuberculosis Maternal Grandmother     Diabetes Maternal Grandfather     Arthritis Paternal Grandfather     Melanoma Neg Hx     Psoriasis Neg Hx     Lupus Neg Hx     Eczema Neg Hx        Social History     Socioeconomic History    Marital status: Single     Spouse name: Not on file    Number of children: Not on file    Years of education: Not on file    Highest education level: Not on file   Occupational History    Not on file   Social Needs    Financial resource strain: Not on file    Food insecurity:     Worry: Not on file     Inability: Not on file    Transportation needs:     Medical: Not on file     Non-medical: Not on file   Tobacco Use  "   Smoking status: Never Smoker    Smokeless tobacco: Never Used   Substance and Sexual Activity    Alcohol use: No    Drug use: No    Sexual activity: Not Currently   Lifestyle    Physical activity:     Days per week: Not on file     Minutes per session: Not on file    Stress: Not on file   Relationships    Social connections:     Talks on phone: Not on file     Gets together: Not on file     Attends Holiness service: Not on file     Active member of club or organization: Not on file     Attends meetings of clubs or organizations: Not on file     Relationship status: Not on file   Other Topics Concern    Not on file   Social History Narrative    Currently not working, looking for a job since moving back to [place].        Review of systems not significantly changed from previous visit except as noted above.    /76   Pulse 67   Temp 98.7 °F (37.1 °C)   Ht 5' 10" (1.778 m)   Wt 77.9 kg (171 lb 10.1 oz)   BMI 24.63 kg/m²   Physical exam not significantly changed from previous visit aside from notable worsening in his left arm movements relative to our last visit.    Data base:  Chart reviewed.      Labs (2/19):  CMP: notable for normal sodium and LFTs  CBC: unremarkable  OXC: 22    MRI of the brain (1/15):   "1. Postoperative right temporal encephalomalacia.  2. Old right periventricular lacunar infarct."   I independently visualized and interpreted this study.     DEXA (12/17):  "A T score of -0.5 is noted which is not suggestive of osteoporosis or osteopenia."    Assessment and plan:  The patient is a 29 y.o. male who returns for follow up on partial onset seizures, improved after temporal lobectomy.  We will continue his Trileptal at 750mg BID and Keppra 1500 mg BID.  We will check a DEXA, as Trileptal may reduce bone density.  We will check labs for medication monitoring purposes.  Medication side effects, seizure safety, and driving laws were reviewed.      Regarding his left arm movements, " he feels that these have worsened since our last visit.  He was following with Dr. Echevarria for this issue but has not seen him recently.  He did not pursue DBS surgery.  At this point, he is not on any medication for his movement disorder.  I have reportedly placed a referral back to Dr. Echevarria, as I think this gives the patient the best chance for improvement on this front.  The patient states today he cannot travel to [place].  I will see if Dr. Todd can see him here.    I will see him back in about a year.

## 2020-06-01 PROBLEM — R25.9 ABNORMAL INVOLUNTARY MOVEMENTS: Status: ACTIVE | Noted: 2020-06-01

## 2020-06-01 NOTE — TELEPHONE ENCOUNTER
Called and left a message for YingJessica regarding an virtual visit with . I stated to give me a call back regarding this matter

## 2020-06-02 ENCOUNTER — HOSPITAL ENCOUNTER (OUTPATIENT)
Dept: RADIOLOGY | Facility: HOSPITAL | Age: 30
Discharge: HOME | End: 2020-06-02
Attending: PSYCHIATRY & NEUROLOGY
Payer: MEDICAID

## 2020-06-02 DIAGNOSIS — G40.909 NONINTRACTABLE EPILEPSY WITHOUT STATUS EPILEPTICUS, UNSPECIFIED EPILEPSY TYPE: ICD-10-CM

## 2020-06-02 DIAGNOSIS — G40.109 TEMPORAL LOBE EPILEPSY: ICD-10-CM

## 2020-06-02 PROCEDURE — 77080 DEXA BONE DENSITY SPINE HIP: ICD-10-PCS | Mod: 26,,, | Performed by: RADIOLOGY

## 2020-06-02 PROCEDURE — 77080 DXA BONE DENSITY AXIAL: CPT | Mod: TC,PO

## 2020-06-02 PROCEDURE — 77080 DXA BONE DENSITY AXIAL: CPT | Mod: 26,,, | Performed by: RADIOLOGY

## 2020-06-30 DIAGNOSIS — G40.909 NONINTRACTABLE EPILEPSY WITHOUT STATUS EPILEPTICUS, UNSPECIFIED EPILEPSY TYPE: ICD-10-CM

## 2020-06-30 DIAGNOSIS — G40.109 TEMPORAL LOBE EPILEPSY: ICD-10-CM

## 2020-06-30 RX ORDER — OXCARBAZEPINE 300 MG/1
TABLET, FILM COATED ORAL
Qty: 150 TABLET | Refills: 1 | Status: SHIPPED | OUTPATIENT
Start: 2020-06-30 | End: 2020-08-31

## 2020-08-17 ENCOUNTER — TELEPHONE (OUTPATIENT)
Dept: NEUROLOGY | Facility: CLINIC | Age: 30
End: 2020-08-17

## 2020-09-15 ENCOUNTER — TELEPHONE (OUTPATIENT)
Dept: NEUROLOGY | Facility: CLINIC | Age: 30
End: 2020-09-15

## 2021-01-04 ENCOUNTER — PATIENT MESSAGE (OUTPATIENT)
Dept: ADMINISTRATIVE | Facility: HOSPITAL | Age: 31
End: 2021-01-04

## 2021-04-06 ENCOUNTER — PATIENT MESSAGE (OUTPATIENT)
Dept: ADMINISTRATIVE | Facility: HOSPITAL | Age: 31
End: 2021-04-06

## 2021-06-01 DIAGNOSIS — G40.909 NONINTRACTABLE EPILEPSY WITHOUT STATUS EPILEPTICUS, UNSPECIFIED EPILEPSY TYPE: ICD-10-CM

## 2021-06-01 DIAGNOSIS — G40.109 TEMPORAL LOBE EPILEPSY: ICD-10-CM

## 2021-06-01 RX ORDER — LEVETIRACETAM 750 MG/1
TABLET ORAL
Qty: 240 TABLET | Refills: 0 | Status: SHIPPED | OUTPATIENT
Start: 2021-06-01 | End: 2021-06-09 | Stop reason: SDUPTHER

## 2021-06-01 RX ORDER — OXCARBAZEPINE 300 MG/1
TABLET, FILM COATED ORAL
Qty: 150 TABLET | Refills: 0 | Status: SHIPPED | OUTPATIENT
Start: 2021-06-01 | End: 2021-06-09 | Stop reason: SDUPTHER

## 2021-06-09 DIAGNOSIS — G40.909 NONINTRACTABLE EPILEPSY WITHOUT STATUS EPILEPTICUS, UNSPECIFIED EPILEPSY TYPE: ICD-10-CM

## 2021-06-09 DIAGNOSIS — G40.109 TEMPORAL LOBE EPILEPSY: ICD-10-CM

## 2021-06-09 RX ORDER — LEVETIRACETAM 750 MG/1
TABLET ORAL
Qty: 240 TABLET | Refills: 0 | Status: SHIPPED | OUTPATIENT
Start: 2021-06-09 | End: 2021-08-30 | Stop reason: SDUPTHER

## 2021-06-09 RX ORDER — OXCARBAZEPINE 300 MG/1
TABLET, FILM COATED ORAL
Qty: 450 TABLET | Refills: 0 | Status: SHIPPED | OUTPATIENT
Start: 2021-06-09 | End: 2021-09-07 | Stop reason: SDUPTHER

## 2021-06-11 ENCOUNTER — TELEPHONE (OUTPATIENT)
Dept: NEUROLOGY | Facility: CLINIC | Age: 31
End: 2021-06-11

## 2021-07-07 ENCOUNTER — PATIENT MESSAGE (OUTPATIENT)
Dept: ADMINISTRATIVE | Facility: HOSPITAL | Age: 31
End: 2021-07-07

## 2021-07-23 ENCOUNTER — TELEPHONE (OUTPATIENT)
Dept: NEUROLOGY | Facility: CLINIC | Age: 31
End: 2021-07-23

## 2021-08-30 DIAGNOSIS — G40.109 TEMPORAL LOBE EPILEPSY: ICD-10-CM

## 2021-08-30 DIAGNOSIS — G40.909 NONINTRACTABLE EPILEPSY WITHOUT STATUS EPILEPTICUS, UNSPECIFIED EPILEPSY TYPE: ICD-10-CM

## 2021-09-07 RX ORDER — LEVETIRACETAM 750 MG/1
TABLET ORAL
Qty: 240 TABLET | Refills: 0 | Status: SHIPPED | OUTPATIENT
Start: 2021-09-07 | End: 2021-11-16 | Stop reason: SDUPTHER

## 2021-11-16 ENCOUNTER — TELEPHONE (OUTPATIENT)
Dept: NEUROLOGY | Facility: CLINIC | Age: 31
End: 2021-11-16
Payer: MEDICAID

## 2021-11-16 DIAGNOSIS — G40.909 NONINTRACTABLE EPILEPSY WITHOUT STATUS EPILEPTICUS, UNSPECIFIED EPILEPSY TYPE: ICD-10-CM

## 2021-11-16 DIAGNOSIS — G40.109 TEMPORAL LOBE EPILEPSY: ICD-10-CM

## 2021-11-17 RX ORDER — LEVETIRACETAM 750 MG/1
TABLET ORAL
Qty: 120 TABLET | Refills: 0 | Status: SHIPPED | OUTPATIENT
Start: 2021-11-17 | End: 2021-12-24 | Stop reason: SDUPTHER

## 2021-11-17 RX ORDER — OXCARBAZEPINE 300 MG/1
TABLET, FILM COATED ORAL
Qty: 150 TABLET | Refills: 0 | Status: SHIPPED | OUTPATIENT
Start: 2021-11-17 | End: 2021-12-24 | Stop reason: SDUPTHER

## 2021-12-27 ENCOUNTER — TELEPHONE (OUTPATIENT)
Dept: NEUROLOGY | Facility: CLINIC | Age: 31
End: 2021-12-27
Payer: MEDICAID

## 2022-01-24 DIAGNOSIS — G40.109 TEMPORAL LOBE EPILEPSY: ICD-10-CM

## 2022-01-24 DIAGNOSIS — G40.909 NONINTRACTABLE EPILEPSY WITHOUT STATUS EPILEPTICUS, UNSPECIFIED EPILEPSY TYPE: ICD-10-CM

## 2022-01-24 RX ORDER — LEVETIRACETAM 750 MG/1
TABLET ORAL
Qty: 120 TABLET | Refills: 0 | OUTPATIENT
Start: 2022-01-24

## 2022-01-24 RX ORDER — OXCARBAZEPINE 300 MG/1
TABLET, FILM COATED ORAL
Qty: 150 TABLET | Refills: 0 | OUTPATIENT
Start: 2022-01-24

## 2022-01-24 NOTE — TELEPHONE ENCOUNTER
This patient has not been seen since mid-2020.  I refilled his medication in mid-2021, at which point I was told he was going to establish with a new neurologist in his new area.  I refilled his medication again late last year.  I cannot keep refilling his medication if he is not following with me.  He has had >6 months to find a new neurologist.  What is the issue?  Refills declined pending explanation.

## 2022-01-24 NOTE — TELEPHONE ENCOUNTER
LVM for pt to inform him of refill denial and to find out his status of establishing care with new neurologist.

## 2022-01-25 ENCOUNTER — TELEPHONE (OUTPATIENT)
Dept: NEUROLOGY | Facility: CLINIC | Age: 32
End: 2022-01-25
Payer: MEDICAID

## 2022-01-25 RX ORDER — OXCARBAZEPINE 300 MG/1
TABLET, FILM COATED ORAL
Qty: 150 TABLET | Refills: 0 | Status: SHIPPED | OUTPATIENT
Start: 2022-01-25

## 2022-01-25 RX ORDER — LEVETIRACETAM 750 MG/1
TABLET ORAL
Qty: 120 TABLET | Refills: 0 | Status: SHIPPED | OUTPATIENT
Start: 2022-01-25

## 2022-01-25 NOTE — TELEPHONE ENCOUNTER
Spoke to pt- advised him of Dr. Borja's message and that his 1 month refill was sent to his pharmacy. Pt v/u.

## 2022-01-25 NOTE — TELEPHONE ENCOUNTER
There is more than one PCP in [place].  Just because this one is not taking new patients, that does not mean that he can't establish care with a different PCP.  I will write for 1 month of AEDs.  After that, either his PCP/neurologist will need to manage further refills, or he will have to come in for an appointment.

## 2022-01-25 NOTE — TELEPHONE ENCOUNTER
----- Message from Wesley Skelton sent at 1/25/2022  2:33 PM CST -----  Regarding: med refill // ret call  Type:  Patient Returning Call    Who Called:  Elias    Who Left Message for Patient:  Carri    Does the patient know what this is regarding?:  yes    Best Call Back Number:  [phone]      Additional Information:  LVM for pt to inform him of refill denial and to find out his status of establishing care with new neurologist.

## 2022-01-25 NOTE — TELEPHONE ENCOUNTER
Spoke to pt- he states that Medicaid in [place] denied him, so from 8/2021-12/2021 he had no insurance. He states that he now has coverage through his employer, effective 1/1/2022. He states that that plan requires him to have a PCP and referral to Neurology. He states that he spoke to a PCP office and they advised him that they were not accepting new pt and to call back on 2/1/2022 to schedule an appt. Pt states he currently has 4-5 days of medication left.

## 2022-02-28 ENCOUNTER — PATIENT MESSAGE (OUTPATIENT)
Dept: ADMINISTRATIVE | Facility: HOSPITAL | Age: 32
End: 2022-02-28
Payer: MEDICAID

## 2022-05-31 ENCOUNTER — PATIENT MESSAGE (OUTPATIENT)
Dept: ADMINISTRATIVE | Facility: HOSPITAL | Age: 32
End: 2022-05-31
Payer: MEDICAID